# Patient Record
Sex: MALE | Race: WHITE | NOT HISPANIC OR LATINO | URBAN - METROPOLITAN AREA
[De-identification: names, ages, dates, MRNs, and addresses within clinical notes are randomized per-mention and may not be internally consistent; named-entity substitution may affect disease eponyms.]

---

## 2017-03-13 ENCOUNTER — OUTPATIENT (OUTPATIENT)
Dept: OUTPATIENT SERVICES | Facility: HOSPITAL | Age: 57
LOS: 1 days | End: 2017-03-13
Payer: COMMERCIAL

## 2017-03-13 VITALS
HEART RATE: 72 BPM | SYSTOLIC BLOOD PRESSURE: 120 MMHG | WEIGHT: 212.08 LBS | RESPIRATION RATE: 16 BRPM | DIASTOLIC BLOOD PRESSURE: 77 MMHG | TEMPERATURE: 98 F | HEIGHT: 69 IN | OXYGEN SATURATION: 97 %

## 2017-03-13 DIAGNOSIS — Z01.818 ENCOUNTER FOR OTHER PREPROCEDURAL EXAMINATION: ICD-10-CM

## 2017-03-13 DIAGNOSIS — Z98.890 OTHER SPECIFIED POSTPROCEDURAL STATES: Chronic | ICD-10-CM

## 2017-03-13 DIAGNOSIS — Z90.49 ACQUIRED ABSENCE OF OTHER SPECIFIED PARTS OF DIGESTIVE TRACT: Chronic | ICD-10-CM

## 2017-03-13 DIAGNOSIS — S83.232D COMPLEX TEAR OF MEDIAL MENISCUS, CURRENT INJURY, LEFT KNEE, SUBSEQUENT ENCOUNTER: ICD-10-CM

## 2017-03-13 DIAGNOSIS — S83.232A COMPLEX TEAR OF MEDIAL MENISCUS, CURRENT INJURY, LEFT KNEE, INITIAL ENCOUNTER: ICD-10-CM

## 2017-03-13 LAB
HCT VFR BLD CALC: 43.7 % — SIGNIFICANT CHANGE UP (ref 39–50)
HGB BLD-MCNC: 14.6 G/DL — SIGNIFICANT CHANGE UP (ref 13–17)
MCHC RBC-ENTMCNC: 30.7 PG — SIGNIFICANT CHANGE UP (ref 27–34)
MCHC RBC-ENTMCNC: 33.4 GM/DL — SIGNIFICANT CHANGE UP (ref 32–36)
MCV RBC AUTO: 91.8 FL — SIGNIFICANT CHANGE UP (ref 80–100)
PLATELET # BLD AUTO: 237 K/UL — SIGNIFICANT CHANGE UP (ref 150–400)
RBC # BLD: 4.76 M/UL — SIGNIFICANT CHANGE UP (ref 4.2–5.8)
RBC # FLD: 14 % — SIGNIFICANT CHANGE UP (ref 10.3–14.5)
WBC # BLD: 10.49 K/UL — SIGNIFICANT CHANGE UP (ref 3.8–10.5)
WBC # FLD AUTO: 10.49 K/UL — SIGNIFICANT CHANGE UP (ref 3.8–10.5)

## 2017-03-13 PROCEDURE — 93010 ELECTROCARDIOGRAM REPORT: CPT | Mod: NC

## 2017-03-13 PROCEDURE — 85027 COMPLETE CBC AUTOMATED: CPT

## 2017-03-13 PROCEDURE — 80053 COMPREHEN METABOLIC PANEL: CPT

## 2017-03-13 PROCEDURE — 93005 ELECTROCARDIOGRAM TRACING: CPT

## 2017-03-13 RX ORDER — ACETAMINOPHEN 500 MG
975 TABLET ORAL ONCE
Qty: 0 | Refills: 0 | Status: COMPLETED | OUTPATIENT
Start: 2017-03-16 | End: 2017-03-16

## 2017-03-13 RX ORDER — LIDOCAINE HCL 20 MG/ML
0.2 VIAL (ML) INJECTION ONCE
Qty: 0 | Refills: 0 | Status: DISCONTINUED | OUTPATIENT
Start: 2017-03-16 | End: 2017-03-31

## 2017-03-13 RX ORDER — CELECOXIB 200 MG/1
200 CAPSULE ORAL ONCE
Qty: 0 | Refills: 0 | Status: COMPLETED | OUTPATIENT
Start: 2017-03-16 | End: 2017-03-16

## 2017-03-13 RX ORDER — SODIUM CHLORIDE 9 MG/ML
3 INJECTION INTRAMUSCULAR; INTRAVENOUS; SUBCUTANEOUS EVERY 8 HOURS
Qty: 0 | Refills: 0 | Status: DISCONTINUED | OUTPATIENT
Start: 2017-03-16 | End: 2017-03-31

## 2017-03-13 NOTE — H&P PST ADULT - HISTORY OF PRESENT ILLNESS
57 year old male with c/o left knee pain x 3 months .Pt had orthopedic consult- s/ MRI left knee revealed 57 year old male with c/o left knee pain x 3 months .Pt had orthopedic consult- s/p MRI left knee revealed complex tear of medial meniscus left knee - for left knee arthroscopy on 03/16/2017.

## 2017-03-13 NOTE — H&P PST ADULT - PMH
Colitis  no flare up x 10 years  Dyslipidemia (high LDL; low HDL)    Seasonal allergies Colitis  no flare up x 10 years  Complex tear of medial meniscus of left knee    Dyslipidemia (high LDL; low HDL)    Seasonal allergies

## 2017-03-13 NOTE — H&P PST ADULT - NSANTHOSAYNRD_GEN_A_CORE
No. DIEGO screening performed.  STOP BANG Legend: 0-2 = LOW Risk; 3-4 = INTERMEDIATE Risk; 5-8 = HIGH Risk

## 2017-03-14 LAB
ALBUMIN SERPL ELPH-MCNC: 4.3 G/DL — SIGNIFICANT CHANGE UP (ref 3.3–5)
ALP SERPL-CCNC: 43 U/L — SIGNIFICANT CHANGE UP (ref 40–120)
ALT FLD-CCNC: 14 U/L — SIGNIFICANT CHANGE UP (ref 10–45)
ANION GAP SERPL CALC-SCNC: 16 MMOL/L — SIGNIFICANT CHANGE UP (ref 5–17)
AST SERPL-CCNC: 29 U/L — SIGNIFICANT CHANGE UP (ref 10–40)
BILIRUB SERPL-MCNC: 0.3 MG/DL — SIGNIFICANT CHANGE UP (ref 0.2–1.2)
BUN SERPL-MCNC: 14 MG/DL — SIGNIFICANT CHANGE UP (ref 7–23)
CALCIUM SERPL-MCNC: 9.7 MG/DL — SIGNIFICANT CHANGE UP (ref 8.4–10.5)
CHLORIDE SERPL-SCNC: 101 MMOL/L — SIGNIFICANT CHANGE UP (ref 96–108)
CO2 SERPL-SCNC: 22 MMOL/L — SIGNIFICANT CHANGE UP (ref 22–31)
CREAT SERPL-MCNC: 0.97 MG/DL — SIGNIFICANT CHANGE UP (ref 0.5–1.3)
GLUCOSE SERPL-MCNC: 82 MG/DL — SIGNIFICANT CHANGE UP (ref 70–99)
POTASSIUM SERPL-MCNC: 4.1 MMOL/L — SIGNIFICANT CHANGE UP (ref 3.5–5.3)
POTASSIUM SERPL-SCNC: 4.1 MMOL/L — SIGNIFICANT CHANGE UP (ref 3.5–5.3)
PROT SERPL-MCNC: 7.7 G/DL — SIGNIFICANT CHANGE UP (ref 6–8.3)
SODIUM SERPL-SCNC: 139 MMOL/L — SIGNIFICANT CHANGE UP (ref 135–145)

## 2017-03-16 ENCOUNTER — OUTPATIENT (OUTPATIENT)
Dept: OUTPATIENT SERVICES | Facility: HOSPITAL | Age: 57
LOS: 1 days | End: 2017-03-16
Payer: COMMERCIAL

## 2017-03-16 VITALS
SYSTOLIC BLOOD PRESSURE: 117 MMHG | OXYGEN SATURATION: 100 % | DIASTOLIC BLOOD PRESSURE: 80 MMHG | RESPIRATION RATE: 16 BRPM | HEART RATE: 59 BPM | TEMPERATURE: 98 F

## 2017-03-16 VITALS
SYSTOLIC BLOOD PRESSURE: 120 MMHG | OXYGEN SATURATION: 97 % | DIASTOLIC BLOOD PRESSURE: 77 MMHG | RESPIRATION RATE: 16 BRPM | HEIGHT: 69 IN | WEIGHT: 212.08 LBS | HEART RATE: 72 BPM | TEMPERATURE: 98 F

## 2017-03-16 DIAGNOSIS — S83.232D COMPLEX TEAR OF MEDIAL MENISCUS, CURRENT INJURY, LEFT KNEE, SUBSEQUENT ENCOUNTER: ICD-10-CM

## 2017-03-16 DIAGNOSIS — Z01.818 ENCOUNTER FOR OTHER PREPROCEDURAL EXAMINATION: ICD-10-CM

## 2017-03-16 DIAGNOSIS — Z98.890 OTHER SPECIFIED POSTPROCEDURAL STATES: Chronic | ICD-10-CM

## 2017-03-16 DIAGNOSIS — Z90.49 ACQUIRED ABSENCE OF OTHER SPECIFIED PARTS OF DIGESTIVE TRACT: Chronic | ICD-10-CM

## 2017-03-16 PROCEDURE — 29881 ARTHRS KNE SRG MNISECTMY M/L: CPT | Mod: LT

## 2017-03-16 RX ORDER — LIDOCAINE HCL 20 MG/ML
0 VIAL (ML) INJECTION
Qty: 0 | Refills: 0 | DISCHARGE
Start: 2017-03-16

## 2017-03-16 RX ORDER — ONDANSETRON 8 MG/1
4 TABLET, FILM COATED ORAL ONCE
Qty: 0 | Refills: 0 | Status: DISCONTINUED | OUTPATIENT
Start: 2017-03-16 | End: 2017-03-31

## 2017-03-16 RX ORDER — CELECOXIB 200 MG/1
200 CAPSULE ORAL ONCE
Qty: 0 | Refills: 0 | Status: DISCONTINUED | OUTPATIENT
Start: 2017-03-16 | End: 2017-03-31

## 2017-03-16 RX ADMIN — Medication 975 MILLIGRAM(S): at 16:29

## 2017-03-16 RX ADMIN — CELECOXIB 200 MILLIGRAM(S): 200 CAPSULE ORAL at 16:29

## 2017-03-16 NOTE — ASU DISCHARGE PLAN (ADULT/PEDIATRIC). - MEDICATION SUMMARY - MEDICATIONS TO STOP TAKING
I will STOP taking the medications listed below when I get home from the hospital:    Aleve 220 mg oral tablet  --  by mouth , As Needed

## 2017-03-16 NOTE — ASU PATIENT PROFILE, ADULT - PMH
Colitis  no flare up x 10 years  Complex tear of medial meniscus of left knee    Dyslipidemia (high LDL; low HDL)    Seasonal allergies

## 2017-03-16 NOTE — ASU DISCHARGE PLAN (ADULT/PEDIATRIC). - NOTIFY
Bleeding that does not stop/Unable to Urinate/Fever greater than 101/Inability to Tolerate Liquids or Foods/Swelling that continues/Persistent Nausea and Vomiting

## 2017-03-16 NOTE — ASU DISCHARGE PLAN (ADULT/PEDIATRIC). - MEDICATION SUMMARY - MEDICATIONS TO TAKE
I will START or STAY ON the medications listed below when I get home from the hospital:    Colazal 750 mg oral capsule  -- 3 cap(s) by mouth 3 times a day  -- Indication: For ulcerative colitis    Ecotrin 325 mg oral delayed release tablet  -- 1 tab(s) by mouth once a day x 6 weeks  -- Indication: For dvt ppx    lidocaine  --     -- Indication: For pain    Claritin 10 mg oral tablet  --  by mouth , As Needed  -- Indication: For allergies    Crestor 10 mg oral tablet  -- 1 tab(s) by mouth once a day (at bedtime)  -- Indication: For hyperlipidemia    Pepcid 20 mg oral tablet  -- 1  by mouth x 2 doses pre op as directed  -- Indication: For ulcer ppx

## 2022-02-21 ENCOUNTER — INPATIENT (INPATIENT)
Facility: HOSPITAL | Age: 62
LOS: 1 days | Discharge: HOME | DRG: 872 | End: 2022-02-23
Attending: INTERNAL MEDICINE | Admitting: INTERNAL MEDICINE
Payer: COMMERCIAL

## 2022-02-21 VITALS
SYSTOLIC BLOOD PRESSURE: 108 MMHG | HEIGHT: 69 IN | HEART RATE: 91 BPM | OXYGEN SATURATION: 98 % | WEIGHT: 197.09 LBS | DIASTOLIC BLOOD PRESSURE: 66 MMHG | RESPIRATION RATE: 20 BRPM | TEMPERATURE: 99 F

## 2022-02-21 DIAGNOSIS — Z29.9 ENCOUNTER FOR PROPHYLACTIC MEASURES, UNSPECIFIED: ICD-10-CM

## 2022-02-21 DIAGNOSIS — Z90.49 ACQUIRED ABSENCE OF OTHER SPECIFIED PARTS OF DIGESTIVE TRACT: Chronic | ICD-10-CM

## 2022-02-21 DIAGNOSIS — Z98.890 OTHER SPECIFIED POSTPROCEDURAL STATES: Chronic | ICD-10-CM

## 2022-02-21 DIAGNOSIS — K51.90 ULCERATIVE COLITIS, UNSPECIFIED, WITHOUT COMPLICATIONS: ICD-10-CM

## 2022-02-21 DIAGNOSIS — N12 TUBULO-INTERSTITIAL NEPHRITIS, NOT SPECIFIED AS ACUTE OR CHRONIC: ICD-10-CM

## 2022-02-21 DIAGNOSIS — E78.5 HYPERLIPIDEMIA, UNSPECIFIED: ICD-10-CM

## 2022-02-21 DIAGNOSIS — N10 ACUTE PYELONEPHRITIS: ICD-10-CM

## 2022-02-21 PROBLEM — E78.4 OTHER HYPERLIPIDEMIA: Chronic | Status: ACTIVE | Noted: 2017-03-13

## 2022-02-21 PROBLEM — K52.9 NONINFECTIVE GASTROENTERITIS AND COLITIS, UNSPECIFIED: Chronic | Status: ACTIVE | Noted: 2017-03-13

## 2022-02-21 PROBLEM — S83.232A COMPLEX TEAR OF MEDIAL MENISCUS, CURRENT INJURY, LEFT KNEE, INITIAL ENCOUNTER: Chronic | Status: ACTIVE | Noted: 2017-03-13

## 2022-02-21 PROBLEM — J30.2 OTHER SEASONAL ALLERGIC RHINITIS: Chronic | Status: ACTIVE | Noted: 2017-03-13

## 2022-02-21 LAB
ALBUMIN SERPL ELPH-MCNC: 3.8 G/DL — SIGNIFICANT CHANGE UP (ref 3.3–5)
ALP SERPL-CCNC: 40 U/L — SIGNIFICANT CHANGE UP (ref 40–120)
ALT FLD-CCNC: 57 U/L — HIGH (ref 10–45)
ANION GAP SERPL CALC-SCNC: 14 MMOL/L — SIGNIFICANT CHANGE UP (ref 5–17)
APPEARANCE UR: CLEAR — SIGNIFICANT CHANGE UP
AST SERPL-CCNC: 83 U/L — HIGH (ref 10–40)
B-OH-BUTYR SERPL-SCNC: 0.2 MMOL/L — SIGNIFICANT CHANGE UP
BACTERIA # UR AUTO: NEGATIVE — SIGNIFICANT CHANGE UP
BASE EXCESS BLDV CALC-SCNC: 2 MMOL/L — SIGNIFICANT CHANGE UP (ref -2–2)
BASOPHILS # BLD AUTO: 0.03 K/UL — SIGNIFICANT CHANGE UP (ref 0–0.2)
BASOPHILS NFR BLD AUTO: 0.2 % — SIGNIFICANT CHANGE UP (ref 0–2)
BILIRUB SERPL-MCNC: 0.4 MG/DL — SIGNIFICANT CHANGE UP (ref 0.2–1.2)
BILIRUB UR-MCNC: NEGATIVE — SIGNIFICANT CHANGE UP
BUN SERPL-MCNC: 20 MG/DL — SIGNIFICANT CHANGE UP (ref 7–23)
CA-I SERPL-SCNC: 1.19 MMOL/L — SIGNIFICANT CHANGE UP (ref 1.15–1.33)
CALCIUM SERPL-MCNC: 9.4 MG/DL — SIGNIFICANT CHANGE UP (ref 8.4–10.5)
CHLORIDE BLDV-SCNC: 97 MMOL/L — SIGNIFICANT CHANGE UP (ref 96–108)
CHLORIDE SERPL-SCNC: 96 MMOL/L — SIGNIFICANT CHANGE UP (ref 96–108)
CO2 BLDV-SCNC: 28 MMOL/L — HIGH (ref 22–26)
CO2 SERPL-SCNC: 23 MMOL/L — SIGNIFICANT CHANGE UP (ref 22–31)
COLOR SPEC: SIGNIFICANT CHANGE UP
CREAT SERPL-MCNC: 1.11 MG/DL — SIGNIFICANT CHANGE UP (ref 0.5–1.3)
DIFF PNL FLD: NEGATIVE — SIGNIFICANT CHANGE UP
EOSINOPHIL # BLD AUTO: 0.04 K/UL — SIGNIFICANT CHANGE UP (ref 0–0.5)
EOSINOPHIL NFR BLD AUTO: 0.3 % — SIGNIFICANT CHANGE UP (ref 0–6)
EPI CELLS # UR: 1 /HPF — SIGNIFICANT CHANGE UP
GAS PNL BLDV: 130 MMOL/L — LOW (ref 136–145)
GAS PNL BLDV: SIGNIFICANT CHANGE UP
GAS PNL BLDV: SIGNIFICANT CHANGE UP
GLUCOSE BLDV-MCNC: 100 MG/DL — HIGH (ref 70–99)
GLUCOSE SERPL-MCNC: 95 MG/DL — SIGNIFICANT CHANGE UP (ref 70–99)
GLUCOSE UR QL: NEGATIVE — SIGNIFICANT CHANGE UP
HCO3 BLDV-SCNC: 26 MMOL/L — SIGNIFICANT CHANGE UP (ref 22–29)
HCT VFR BLD CALC: 44.1 % — SIGNIFICANT CHANGE UP (ref 39–50)
HCT VFR BLDA CALC: 46 % — SIGNIFICANT CHANGE UP (ref 39–51)
HGB BLD CALC-MCNC: 15.2 G/DL — SIGNIFICANT CHANGE UP (ref 12.6–17.4)
HGB BLD-MCNC: 14.8 G/DL — SIGNIFICANT CHANGE UP (ref 13–17)
HYALINE CASTS # UR AUTO: 1 /LPF — SIGNIFICANT CHANGE UP (ref 0–2)
IMM GRANULOCYTES NFR BLD AUTO: 0.3 % — SIGNIFICANT CHANGE UP (ref 0–1.5)
KETONES UR-MCNC: NEGATIVE — SIGNIFICANT CHANGE UP
LACTATE BLDV-MCNC: 1.3 MMOL/L — SIGNIFICANT CHANGE UP (ref 0.7–2)
LEUKOCYTE ESTERASE UR-ACNC: NEGATIVE — SIGNIFICANT CHANGE UP
LYMPHOCYTES # BLD AUTO: 1.41 K/UL — SIGNIFICANT CHANGE UP (ref 1–3.3)
LYMPHOCYTES # BLD AUTO: 10.6 % — LOW (ref 13–44)
MAGNESIUM SERPL-MCNC: 2.2 MG/DL — SIGNIFICANT CHANGE UP (ref 1.6–2.6)
MCHC RBC-ENTMCNC: 30.4 PG — SIGNIFICANT CHANGE UP (ref 27–34)
MCHC RBC-ENTMCNC: 33.6 GM/DL — SIGNIFICANT CHANGE UP (ref 32–36)
MCV RBC AUTO: 90.6 FL — SIGNIFICANT CHANGE UP (ref 80–100)
MONOCYTES # BLD AUTO: 1.23 K/UL — HIGH (ref 0–0.9)
MONOCYTES NFR BLD AUTO: 9.2 % — SIGNIFICANT CHANGE UP (ref 2–14)
NEUTROPHILS # BLD AUTO: 10.59 K/UL — HIGH (ref 1.8–7.4)
NEUTROPHILS NFR BLD AUTO: 79.4 % — HIGH (ref 43–77)
NITRITE UR-MCNC: NEGATIVE — SIGNIFICANT CHANGE UP
NRBC # BLD: 0 /100 WBCS — SIGNIFICANT CHANGE UP (ref 0–0)
PCO2 BLDV: 40 MMHG — LOW (ref 42–55)
PH BLDV: 7.43 — SIGNIFICANT CHANGE UP (ref 7.32–7.43)
PH UR: 6 — SIGNIFICANT CHANGE UP (ref 5–8)
PLATELET # BLD AUTO: 254 K/UL — SIGNIFICANT CHANGE UP (ref 150–400)
PO2 BLDV: 31 MMHG — SIGNIFICANT CHANGE UP (ref 25–45)
POTASSIUM BLDV-SCNC: 3.4 MMOL/L — LOW (ref 3.5–5.1)
POTASSIUM SERPL-MCNC: 3.5 MMOL/L — SIGNIFICANT CHANGE UP (ref 3.5–5.3)
POTASSIUM SERPL-SCNC: 3.5 MMOL/L — SIGNIFICANT CHANGE UP (ref 3.5–5.3)
PROT SERPL-MCNC: 7.8 G/DL — SIGNIFICANT CHANGE UP (ref 6–8.3)
PROT UR-MCNC: ABNORMAL
RAPID RVP RESULT: SIGNIFICANT CHANGE UP
RBC # BLD: 4.87 M/UL — SIGNIFICANT CHANGE UP (ref 4.2–5.8)
RBC # FLD: 13.9 % — SIGNIFICANT CHANGE UP (ref 10.3–14.5)
RBC CASTS # UR COMP ASSIST: 4 /HPF — SIGNIFICANT CHANGE UP (ref 0–4)
SAO2 % BLDV: 54.6 % — LOW (ref 67–88)
SARS-COV-2 RNA SPEC QL NAA+PROBE: SIGNIFICANT CHANGE UP
SODIUM SERPL-SCNC: 133 MMOL/L — LOW (ref 135–145)
SP GR SPEC: 1.02 — SIGNIFICANT CHANGE UP (ref 1.01–1.02)
UROBILINOGEN FLD QL: NEGATIVE — SIGNIFICANT CHANGE UP
WBC # BLD: 13.34 K/UL — HIGH (ref 3.8–10.5)
WBC # FLD AUTO: 13.34 K/UL — HIGH (ref 3.8–10.5)
WBC UR QL: 3 /HPF — SIGNIFICANT CHANGE UP (ref 0–5)

## 2022-02-21 PROCEDURE — 99223 1ST HOSP IP/OBS HIGH 75: CPT

## 2022-02-21 PROCEDURE — 74177 CT ABD & PELVIS W/CONTRAST: CPT | Mod: 26,MA

## 2022-02-21 PROCEDURE — 71046 X-RAY EXAM CHEST 2 VIEWS: CPT | Mod: 26

## 2022-02-21 PROCEDURE — 99285 EMERGENCY DEPT VISIT HI MDM: CPT

## 2022-02-21 RX ORDER — ASCORBIC ACID 60 MG
500 TABLET,CHEWABLE ORAL DAILY
Refills: 0 | Status: DISCONTINUED | OUTPATIENT
Start: 2022-02-21 | End: 2022-02-23

## 2022-02-21 RX ORDER — ATORVASTATIN CALCIUM 80 MG/1
40 TABLET, FILM COATED ORAL AT BEDTIME
Refills: 0 | Status: DISCONTINUED | OUTPATIENT
Start: 2022-02-21 | End: 2022-02-23

## 2022-02-21 RX ORDER — ASPIRIN/CALCIUM CARB/MAGNESIUM 324 MG
1 TABLET ORAL
Qty: 0 | Refills: 0 | DISCHARGE

## 2022-02-21 RX ORDER — SODIUM CHLORIDE 9 MG/ML
1000 INJECTION INTRAMUSCULAR; INTRAVENOUS; SUBCUTANEOUS ONCE
Refills: 0 | Status: COMPLETED | OUTPATIENT
Start: 2022-02-21 | End: 2022-02-21

## 2022-02-21 RX ORDER — FAMOTIDINE 10 MG/ML
1 INJECTION INTRAVENOUS
Qty: 0 | Refills: 0 | DISCHARGE

## 2022-02-21 RX ORDER — LORATADINE 10 MG/1
0 TABLET ORAL
Qty: 0 | Refills: 0 | DISCHARGE

## 2022-02-21 RX ORDER — ENOXAPARIN SODIUM 100 MG/ML
40 INJECTION SUBCUTANEOUS DAILY
Refills: 0 | Status: DISCONTINUED | OUTPATIENT
Start: 2022-02-21 | End: 2022-02-22

## 2022-02-21 RX ORDER — ACETAMINOPHEN 500 MG
975 TABLET ORAL ONCE
Refills: 0 | Status: COMPLETED | OUTPATIENT
Start: 2022-02-21 | End: 2022-02-21

## 2022-02-21 RX ORDER — LORATADINE 10 MG/1
10 TABLET ORAL DAILY
Refills: 0 | Status: DISCONTINUED | OUTPATIENT
Start: 2022-02-21 | End: 2022-02-22

## 2022-02-21 RX ORDER — CEFTRIAXONE 500 MG/1
1000 INJECTION, POWDER, FOR SOLUTION INTRAMUSCULAR; INTRAVENOUS EVERY 24 HOURS
Refills: 0 | Status: DISCONTINUED | OUTPATIENT
Start: 2022-02-21 | End: 2022-02-23

## 2022-02-21 RX ORDER — BALSALAZIDE DISODIUM 750 MG/1
2250 CAPSULE ORAL THREE TIMES A DAY
Refills: 0 | Status: DISCONTINUED | OUTPATIENT
Start: 2022-02-21 | End: 2022-02-23

## 2022-02-21 RX ORDER — FINASTERIDE 5 MG/1
5 TABLET, FILM COATED ORAL DAILY
Refills: 0 | Status: DISCONTINUED | OUTPATIENT
Start: 2022-02-21 | End: 2022-02-23

## 2022-02-21 RX ORDER — CEFTRIAXONE 500 MG/1
1000 INJECTION, POWDER, FOR SOLUTION INTRAMUSCULAR; INTRAVENOUS ONCE
Refills: 0 | Status: COMPLETED | OUTPATIENT
Start: 2022-02-21 | End: 2022-02-21

## 2022-02-21 RX ORDER — ACETAMINOPHEN 500 MG
650 TABLET ORAL EVERY 6 HOURS
Refills: 0 | Status: DISCONTINUED | OUTPATIENT
Start: 2022-02-21 | End: 2022-02-23

## 2022-02-21 RX ADMIN — Medication 975 MILLIGRAM(S): at 15:45

## 2022-02-21 RX ADMIN — Medication 975 MILLIGRAM(S): at 16:34

## 2022-02-21 RX ADMIN — SODIUM CHLORIDE 1000 MILLILITER(S): 9 INJECTION INTRAMUSCULAR; INTRAVENOUS; SUBCUTANEOUS at 15:48

## 2022-02-21 RX ADMIN — CEFTRIAXONE 100 MILLIGRAM(S): 500 INJECTION, POWDER, FOR SOLUTION INTRAMUSCULAR; INTRAVENOUS at 20:12

## 2022-02-21 RX ADMIN — SODIUM CHLORIDE 1000 MILLILITER(S): 9 INJECTION INTRAMUSCULAR; INTRAVENOUS; SUBCUTANEOUS at 16:34

## 2022-02-21 NOTE — H&P ADULT - RS GEN PE MLT RESP DETAILS PC
breath sounds equal/respirations non-labored/clear to auscultation bilaterally/no chest wall tenderness

## 2022-02-21 NOTE — ED PROVIDER NOTE - NS ED ROS FT
Constitutional :fevers;  chills  HEENT: no visual changes, no sore throat, no rhinorrhea  CV: no cp; no palpitations  Resp: no sob; no cough  GI: no abd pain, nausea, no vomiting, diarrhea, no constipation  : no dysuria, no hematuria  MSK: no myalgias; no arthralgias  skin: no rashes  neuro: no HA, no numbness; no weakness, no tingling  endo:  polyuria, polydipsia

## 2022-02-21 NOTE — ED PROVIDER NOTE - CLINICAL SUMMARY MEDICAL DECISION MAKING FREE TEXT BOX
Melecio Mcrae MD. pt presents for 6 days of fever [id number], chills, night sweats, diarrhea, nausea. also 3 weeks of polyuria, polydipsia. HD stable. pt abd soft nt nd. fever of unclear etiology at this time but also concern for new onset diabetes and possible DKA. will check labs, iv fluids, cxr, urine, reassess

## 2022-02-21 NOTE — H&P ADULT - NSHPSOCIALHISTORY_GEN_ALL_CORE
reports previously being an alcohol but quit drinking ~25yrs ago, quit smoking ~20yrs ago, denies recreational drug use, works as a  and notes occasional exposure to asbestos

## 2022-02-21 NOTE — H&P ADULT - NSHPPHYSICALEXAM_GEN_ALL_CORE
Vital Signs Last 24 Hrs  T(C): 36.8 (21 Feb 2022 20:17), Max: 38.9 (21 Feb 2022 15:35)  T(F): 98.2 (21 Feb 2022 20:17), Max: 102.1 (21 Feb 2022 15:35)  HR: 67 (21 Feb 2022 20:17) (67 - 91)  BP: 123/81 (21 Feb 2022 20:17) (108/66 - 123/81)  BP(mean): --  RR: 18 (21 Feb 2022 20:17) (17 - 20)  SpO2: 98% (21 Feb 2022 20:17) (98% - 100%)

## 2022-02-21 NOTE — ED ADULT NURSE NOTE - OBJECTIVE STATEMENT
62 y m came to the ed c/o intermittent fevers, night sweats, nausea/vomiting/diarrhea and decreased PO intake for 1 week. states he called his PMD who was unable to see him. hx of colitis although denies bloody stool or vomiting. patient is a/ox3. c/o increased urinary frequency but denies pain/burning on urination. skin is warm and dry.

## 2022-02-21 NOTE — ED PROVIDER NOTE - NSICDXPASTMEDICALHX_GEN_ALL_CORE_FT
PAST MEDICAL HISTORY:  Colitis no flare up x 10 years    Complex tear of medial meniscus of left knee     Dyslipidemia (high LDL; low HDL)     Seasonal allergies

## 2022-02-21 NOTE — ED ADULT NURSE REASSESSMENT NOTE - NS ED NURSE REASSESS COMMENT FT1
per MD Huggins patient ok to eat and drink. patient provided with sandwiches, ginger ale at this time

## 2022-02-21 NOTE — ED PROVIDER NOTE - PHYSICAL EXAMINATION
PHYSICAL EXAM:  GENERAL: non-toxic appearing; in no respiratory distress  HEAD Atraumatic, Normocephalic  ENT: dry mucous membranes;  NECK: No JVD; trachea midline  EYES: PERRL, EOMs intact b/l w/out deficits; normal conjunctiva  CHEST/LUNG: CTAB no wheezes/rhonchi/rales  HEART: RRR no murmur/gallops/rubs  ABDOMEN: +BS, soft, NT, ND  EXTREMITIES: No LE edema, +2 radial pulses b/l, +2 DP/PT pulses b/l  MUSCULOSKELETAL: FROM of all 4 extremities;  NERVOUS SYSTEM:  A&Ox3, No motor deficits or sensory deficits; CNII-XII intact; Speech is fluent and appropriate  SKIN:  Warm and dry as visualized

## 2022-02-21 NOTE — ED PROVIDER NOTE - ATTENDING CONTRIBUTION TO CARE
62 M w/ hx of HLD, colitis, prior appy presents ot the Er w/ fever tmax 102.7 took advil today pt states no rashes, reports 1-3 times of diarrhea a day no blood in stool, pt also states had two episodes of vomiting occurred 5-6 days ago now resolved. pt w/ no recent antibiotic usage. Pt denies any lightheadedness, no dizziness, no dysuria, + urinary freq/urgency, on exam, pt is awake and alert in mild distress, has clear lungs soft abdomen w/ no cva tenderness, no lower leg swelling, no rashes. moving all extremities. Plan for labs and reassessment. concern for possible sepsis, infectious diarrhea, dispo pending results likely TBA 62 M w/ hx of HLD, colitis, prior appy presents ot the Er w/ fever tmax 102.7 took advil today pt states no rashes, reports 1-3 times of diarrhea a day no blood in stool, pt also states had two episodes of vomiting occurred 5-6 days ago now resolved. pt w/ no recent antibiotic usage. Pt denies any lightheadedness, no dizziness, no dysuria, + urinary freq/urgency, on exam, pt is awake and alert in mild distress, has clear lungs soft abdomenmild RLQ tenderness w/ no cva tenderness, no lower leg swelling, no rashes. moving all extremities. Plan for labs and reassessment. concern for possible sepsis, infectious diarrhea, dispo pending results likely TBA ct ordered given pt w/ known hx of UC to assess for fever of unknown origin possible intraabdominal infection .

## 2022-02-21 NOTE — ED PROVIDER NOTE - PROGRESS NOTE DETAILS
Nik Saeed, PGY2. CT showing pyelonephritis. Abx given. Signed out to Dr. Lundy. Stable at this time, will admit.

## 2022-02-21 NOTE — ED PROVIDER NOTE - NSICDXPASTSURGICALHX_GEN_ALL_CORE_FT
PAST SURGICAL HISTORY:  H/O arthroscopy of right knee     History of appendectomy     History of strabismus surgery

## 2022-02-21 NOTE — ED ADULT NURSE NOTE - NSIMPLEMENTINTERV_GEN_ALL_ED
Implemented All Universal Safety Interventions:  [place] to call system. Call bell, personal items and telephone within reach. Instruct patient to call for assistance. Room bathroom lighting operational. Non-slip footwear when patient is off stretcher. Physically safe environment: no spills, clutter or unnecessary equipment. Stretcher in lowest position, wheels locked, appropriate side rails in place.

## 2022-02-21 NOTE — H&P ADULT - HISTORY OF PRESENT ILLNESS
61yo M w/ PMHx of Ulcerative Colitis, HLD presents with fever, he reports that starting a week ago he began having persistent sweats that drenched the bed sheets, he subsequently became febrile as well up to 102, he tried taking tylenol for his symptoms which provided only temporary relief, he states his symptoms initially were only at night but then symptoms persisted through the day as well, he endorses associated diarrhea and 2 isolated episodes of non-bloody non-bilious vomit but denies cough, SOB, chest pain, hematochezia, melena, flank pain, hematuria or dysuria, in the ED he was febrile to 102.1 but otherwise hemodynamically stable, saturating well on room air, labs were significant for leukocytosis and mild transaminitis, CXR was unremarkable, CT A/P showed findings concerning for ureteritis/nephritis, patient was given ceftriaxone 1g x1 and admitted t to general medicine for further management.

## 2022-02-21 NOTE — ED PROVIDER NOTE - OBJECTIVE STATEMENT
63 yo M pmhx HLD, colitis, s/p appy, presents to ED c/o 6 days of intermittent fevers tmax 102. improves after taking otc tylenol but fever returns. also c/o night sweats and chills, inability to tolerate PO due to nausa, and watery diarrhea. He is also c/o weeks of polyuria and polydipsia. He denies abd pain or vomiting. he denies cough, cp, sob, dizziness, loc, recent travel, recent abx use, melena, brbpr.

## 2022-02-21 NOTE — H&P ADULT - PROBLEM SELECTOR PLAN 1
-U/A unremarkable but imaging findings, fever and leukocytosis highly suspicious for pyelonephritis   -s/p ceftriaxone x1 in ED  -c/w ceftriaxone 1g daily   -f/u urine cultures  -obtain blader scan  -monitor I/O's -U/A unremarkable but imaging findings, fever and leukocytosis highly suspicious for acute pyelonephritis   -s/p ceftriaxone x1 in ED  -c/w ceftriaxone 1g daily   -f/u urine cultures  -obtain blader scan  -monitor I/O's  -Tylenol PRN for fever or pain

## 2022-02-22 LAB
ALBUMIN SERPL ELPH-MCNC: 3.3 G/DL — SIGNIFICANT CHANGE UP (ref 3.3–5)
ALP SERPL-CCNC: 34 U/L — LOW (ref 40–120)
ALT FLD-CCNC: 46 U/L — HIGH (ref 10–45)
ANION GAP SERPL CALC-SCNC: 12 MMOL/L — SIGNIFICANT CHANGE UP (ref 5–17)
AST SERPL-CCNC: 55 U/L — HIGH (ref 10–40)
BASOPHILS # BLD AUTO: 0.02 K/UL — SIGNIFICANT CHANGE UP (ref 0–0.2)
BASOPHILS NFR BLD AUTO: 0.2 % — SIGNIFICANT CHANGE UP (ref 0–2)
BILIRUB SERPL-MCNC: 0.4 MG/DL — SIGNIFICANT CHANGE UP (ref 0.2–1.2)
BUN SERPL-MCNC: 17 MG/DL — SIGNIFICANT CHANGE UP (ref 7–23)
CALCIUM SERPL-MCNC: 9 MG/DL — SIGNIFICANT CHANGE UP (ref 8.4–10.5)
CHLORIDE SERPL-SCNC: 100 MMOL/L — SIGNIFICANT CHANGE UP (ref 96–108)
CO2 SERPL-SCNC: 23 MMOL/L — SIGNIFICANT CHANGE UP (ref 22–31)
CREAT SERPL-MCNC: 1.02 MG/DL — SIGNIFICANT CHANGE UP (ref 0.5–1.3)
CULTURE RESULTS: SIGNIFICANT CHANGE UP
EOSINOPHIL # BLD AUTO: 0.07 K/UL — SIGNIFICANT CHANGE UP (ref 0–0.5)
EOSINOPHIL NFR BLD AUTO: 0.7 % — SIGNIFICANT CHANGE UP (ref 0–6)
GLUCOSE SERPL-MCNC: 92 MG/DL — SIGNIFICANT CHANGE UP (ref 70–99)
HCT VFR BLD CALC: 37.5 % — LOW (ref 39–50)
HCV AB S/CO SERPL IA: 0.11 S/CO — SIGNIFICANT CHANGE UP (ref 0–0.99)
HCV AB SERPL-IMP: SIGNIFICANT CHANGE UP
HGB BLD-MCNC: 12.6 G/DL — LOW (ref 13–17)
IMM GRANULOCYTES NFR BLD AUTO: 0.6 % — SIGNIFICANT CHANGE UP (ref 0–1.5)
LYMPHOCYTES # BLD AUTO: 1.66 K/UL — SIGNIFICANT CHANGE UP (ref 1–3.3)
LYMPHOCYTES # BLD AUTO: 16.8 % — SIGNIFICANT CHANGE UP (ref 13–44)
MAGNESIUM SERPL-MCNC: 2.3 MG/DL — SIGNIFICANT CHANGE UP (ref 1.6–2.6)
MCHC RBC-ENTMCNC: 30.4 PG — SIGNIFICANT CHANGE UP (ref 27–34)
MCHC RBC-ENTMCNC: 33.6 GM/DL — SIGNIFICANT CHANGE UP (ref 32–36)
MCV RBC AUTO: 90.6 FL — SIGNIFICANT CHANGE UP (ref 80–100)
MONOCYTES # BLD AUTO: 1.08 K/UL — HIGH (ref 0–0.9)
MONOCYTES NFR BLD AUTO: 10.9 % — SIGNIFICANT CHANGE UP (ref 2–14)
NEUTROPHILS # BLD AUTO: 6.99 K/UL — SIGNIFICANT CHANGE UP (ref 1.8–7.4)
NEUTROPHILS NFR BLD AUTO: 70.8 % — SIGNIFICANT CHANGE UP (ref 43–77)
NRBC # BLD: 0 /100 WBCS — SIGNIFICANT CHANGE UP (ref 0–0)
PHOSPHATE SERPL-MCNC: 4 MG/DL — SIGNIFICANT CHANGE UP (ref 2.5–4.5)
PLATELET # BLD AUTO: 222 K/UL — SIGNIFICANT CHANGE UP (ref 150–400)
POTASSIUM SERPL-MCNC: 3.9 MMOL/L — SIGNIFICANT CHANGE UP (ref 3.5–5.3)
POTASSIUM SERPL-SCNC: 3.9 MMOL/L — SIGNIFICANT CHANGE UP (ref 3.5–5.3)
PROT SERPL-MCNC: 6.6 G/DL — SIGNIFICANT CHANGE UP (ref 6–8.3)
RBC # BLD: 4.14 M/UL — LOW (ref 4.2–5.8)
RBC # FLD: 14.1 % — SIGNIFICANT CHANGE UP (ref 10.3–14.5)
SODIUM SERPL-SCNC: 135 MMOL/L — SIGNIFICANT CHANGE UP (ref 135–145)
SPECIMEN SOURCE: SIGNIFICANT CHANGE UP
WBC # BLD: 9.88 K/UL — SIGNIFICANT CHANGE UP (ref 3.8–10.5)
WBC # FLD AUTO: 9.88 K/UL — SIGNIFICANT CHANGE UP (ref 3.8–10.5)

## 2022-02-22 RX ORDER — INFLUENZA VIRUS VACCINE 15; 15; 15; 15 UG/.5ML; UG/.5ML; UG/.5ML; UG/.5ML
0.5 SUSPENSION INTRAMUSCULAR ONCE
Refills: 0 | Status: DISCONTINUED | OUTPATIENT
Start: 2022-02-22 | End: 2022-02-23

## 2022-02-22 RX ORDER — SODIUM CHLORIDE 9 MG/ML
1000 INJECTION, SOLUTION INTRAVENOUS
Refills: 0 | Status: DISCONTINUED | OUTPATIENT
Start: 2022-02-22 | End: 2022-02-23

## 2022-02-22 RX ORDER — TAMSULOSIN HYDROCHLORIDE 0.4 MG/1
0.4 CAPSULE ORAL AT BEDTIME
Refills: 0 | Status: DISCONTINUED | OUTPATIENT
Start: 2022-02-22 | End: 2022-02-23

## 2022-02-22 RX ADMIN — SODIUM CHLORIDE 75 MILLILITER(S): 9 INJECTION, SOLUTION INTRAVENOUS at 14:13

## 2022-02-22 RX ADMIN — TAMSULOSIN HYDROCHLORIDE 0.4 MILLIGRAM(S): 0.4 CAPSULE ORAL at 21:54

## 2022-02-22 RX ADMIN — ATORVASTATIN CALCIUM 40 MILLIGRAM(S): 80 TABLET, FILM COATED ORAL at 00:37

## 2022-02-22 RX ADMIN — BALSALAZIDE DISODIUM 2250 MILLIGRAM(S): 750 CAPSULE ORAL at 08:31

## 2022-02-22 RX ADMIN — Medication 1 TABLET(S): at 11:09

## 2022-02-22 RX ADMIN — BALSALAZIDE DISODIUM 2250 MILLIGRAM(S): 750 CAPSULE ORAL at 21:55

## 2022-02-22 RX ADMIN — Medication 650 MILLIGRAM(S): at 00:36

## 2022-02-22 RX ADMIN — Medication 500 MILLIGRAM(S): at 11:09

## 2022-02-22 RX ADMIN — FINASTERIDE 5 MILLIGRAM(S): 5 TABLET, FILM COATED ORAL at 11:09

## 2022-02-22 RX ADMIN — BALSALAZIDE DISODIUM 2250 MILLIGRAM(S): 750 CAPSULE ORAL at 15:00

## 2022-02-22 RX ADMIN — SODIUM CHLORIDE 100 MILLILITER(S): 9 INJECTION, SOLUTION INTRAVENOUS at 11:09

## 2022-02-22 RX ADMIN — Medication 650 MILLIGRAM(S): at 03:00

## 2022-02-22 RX ADMIN — ATORVASTATIN CALCIUM 40 MILLIGRAM(S): 80 TABLET, FILM COATED ORAL at 21:54

## 2022-02-22 RX ADMIN — BALSALAZIDE DISODIUM 2250 MILLIGRAM(S): 750 CAPSULE ORAL at 01:20

## 2022-02-22 RX ADMIN — CEFTRIAXONE 100 MILLIGRAM(S): 500 INJECTION, POWDER, FOR SOLUTION INTRAMUSCULAR; INTRAVENOUS at 21:58

## 2022-02-22 NOTE — PROGRESS NOTE ADULT - SUBJECTIVE AND OBJECTIVE BOX
INTERVAL HPI/OVERNIGHT EVENTS:  Pt seen and examined at bedside.     Allergies/Intolerance: No Known Allergies      MEDICATIONS  (STANDING):  ascorbic acid 500 milliGRAM(s) Oral daily  atorvastatin 40 milliGRAM(s) Oral at bedtime  balsalazide 2250 milliGRAM(s) Oral three times a day  cefTRIAXone   IVPB 1000 milliGRAM(s) IV Intermittent every 24 hours  enoxaparin Injectable 40 milliGRAM(s) SubCutaneous daily  finasteride 5 milliGRAM(s) Oral daily  loratadine 10 milliGRAM(s) Oral daily  multivitamin 1 Tablet(s) Oral daily    MEDICATIONS  (PRN):  acetaminophen     Tablet .. 650 milliGRAM(s) Oral every 6 hours PRN Temp greater or equal to 38C (100.4F), Mild Pain (1 - 3), Moderate Pain (4 - 6)        ROS: all systems reviewed and wnl      PHYSICAL EXAMINATION:  Vital Signs Last 24 Hrs  T(C): 36.7 (2022 05:21), Max: 38.9 (2022 15:35)  T(F): 98.1 (2022 05:21), Max: 102.1 (2022 15:35)  HR: 63 (2022 05:21) (63 - 93)  BP: 109/68 (2022 05:21) (108/66 - 127/81)  BP(mean): --  RR: 18 (2022 05:21) (17 - 20)  SpO2: 94% (2022 05:21) (94% - 100%)  CAPILLARY BLOOD GLUCOSE            GENERAL: in bed, stable, tolerates PO diet, no SOB or fevers.   NECK: supple, No JVD  CHEST/LUNG: clear to auscultation bilaterally; no rales, rhonchi, or wheezing b/l  HEART: normal S1, S2  ABDOMEN: BS+, soft, ND, NT   EXTREMITIES:  pulses palpable; no clubbing, cyanosis, or edema b/l LEs  SKIN: no rashes or lesions      LABS:                        12.6   9.88  )-----------( 222      ( 2022 06:55 )             37.5     02-22    135  |  100  |  17  ----------------------------<  92  3.9   |  23  |  1.02    Ca    9.0      2022 06:55  Phos  4.0       Mg     2.3         TPro  6.6  /  Alb  3.3  /  TBili  0.4  /  DBili  x   /  AST  55<H>  /  ALT  46<H>  /  AlkPhos  34<L>        Urinalysis Basic - ( 2022 19:38 )    Color: Light Yellow / Appearance: Clear / S.025 / pH: x  Gluc: x / Ketone: Negative  / Bili: Negative / Urobili: Negative   Blood: x / Protein: Trace / Nitrite: Negative   Leuk Esterase: Negative / RBC: 4 /hpf / WBC 3 /HPF   Sq Epi: x / Non Sq Epi: 1 /hpf / Bacteria: Negative             INTERVAL HPI/OVERNIGHT EVENTS:  Pt seen and examined at bedside.     Allergies/Intolerance: No Known Allergies      MEDICATIONS  (STANDING):  ascorbic acid 500 milliGRAM(s) Oral daily  atorvastatin 40 milliGRAM(s) Oral at bedtime  balsalazide 2250 milliGRAM(s) Oral three times a day  cefTRIAXone   IVPB 1000 milliGRAM(s) IV Intermittent every 24 hours  enoxaparin Injectable 40 milliGRAM(s) SubCutaneous daily  finasteride 5 milliGRAM(s) Oral daily  loratadine 10 milliGRAM(s) Oral daily  multivitamin 1 Tablet(s) Oral daily    MEDICATIONS  (PRN):  acetaminophen     Tablet .. 650 milliGRAM(s) Oral every 6 hours PRN Temp greater or equal to 38C (100.4F), Mild Pain (1 - 3), Moderate Pain (4 - 6)        ROS: all systems reviewed and wnl      PHYSICAL EXAMINATION:  Vital Signs Last 24 Hrs  T(C): 36.7 (2022 05:21), Max: 38.9 (2022 15:35)  T(F): 98.1 (2022 05:21), Max: 102.1 (2022 15:35)  HR: 63 (2022 05:21) (63 - 93)  BP: 109/68 (2022 05:21) (108/66 - 127/81)  BP(mean): --  RR: 18 (2022 05:21) (17 - 20)  SpO2: 94% (2022 05:21) (94% - 100%)  CAPILLARY BLOOD GLUCOSE            GENERAL: in bed, stable, tolerates PO diet, no SOB or fevers, ambulates well.    NECK: supple, No JVD  CHEST/LUNG: clear to auscultation bilaterally; no rales, rhonchi, or wheezing b/l  HEART: normal S1, S2  ABDOMEN: BS+, soft, ND, NT   EXTREMITIES:  pulses palpable; no clubbing, cyanosis, or edema b/l LEs  SKIN: no rashes or lesions      LABS:                        12.6   9.88  )-----------( 222      ( 2022 06:55 )             37.5     02-    135  |  100  |  17  ----------------------------<  92  3.9   |  23  |  1.02    Ca    9.0      2022 06:55  Phos  4.0       Mg     2.3         TPro  6.6  /  Alb  3.3  /  TBili  0.4  /  DBili  x   /  AST  55<H>  /  ALT  46<H>  /  AlkPhos  34<L>        Urinalysis Basic - ( 2022 19:38 )    Color: Light Yellow / Appearance: Clear / S.025 / pH: x  Gluc: x / Ketone: Negative  / Bili: Negative / Urobili: Negative   Blood: x / Protein: Trace / Nitrite: Negative   Leuk Esterase: Negative / RBC: 4 /hpf / WBC 3 /HPF   Sq Epi: x / Non Sq Epi: 1 /hpf / Bacteria: Negative

## 2022-02-22 NOTE — PROVIDER CONTACT NOTE (OTHER) - ASSESSMENT
Pt AOx4, no chills noted. Pt temp 101, other VSS. No acute distress ordered. Pt already received IV abx and blood cultures done

## 2022-02-22 NOTE — PROGRESS NOTE ADULT - PROBLEM SELECTOR PLAN 1
-U/A unremarkable but imaging findings, fever and leukocytosis highly suspicious for acute pyelonephritis   -s/p ceftriaxone x1 in ED  -c/w ceftriaxone 1g daily   -f/u urine cultures  -obtain blader scan  -monitor I/O's  -Tylenol PRN for fever or pain -U/A unremarkable but imaging findings, fever and leukocytosis highly suspicious for acute pyelonephritis   -s/p ceftriaxone daily,  f/u urine cultures, Tylenol PRN for fever or pain.     Added Flomax to Proscar as enlarged prostate noted on CT. CT notes bilateral pylo, no renal abscess, no   renal calculus. IVF for another 24 hours then stop.     Discharge home Wednesday if afebrile and tolerating PO diet.

## 2022-02-22 NOTE — PATIENT PROFILE ADULT - FALL HARM RISK - HARM RISK INTERVENTIONS

## 2022-02-22 NOTE — PATIENT PROFILE ADULT - PACKS YRS CALCULATION
Patient was scheduled today to see MD Correa, however provider is out. Patient has a new upcoming appt   3/28/2022 7:30 AM Fer Correa MD [facility]     Patient verbalized understanding.    22

## 2022-02-22 NOTE — PATIENT PROFILE ADULT - OVER THE PAST TWO WEEKS HAVE YOU FELT DOWN, DEPRESSED OR HOPELESS?
Progress Notes by Anais Renteria MS, MA, CCC-SLP/L at 17 06:25 PM     Author:  Anais Renteria MS, MA, CCC-SLP/L Service:  (none) Author Type:  Therapist     Filed:  17 06:30 PM Encounter Date:  2017 Status:  Signed     :  Anais Renteria MS, MA, CCC-SLP/L (Therapist)            PEDIATRIC SPEECH-LANGUAGE THERAPY DAILY NOTE  Patient's name: Jhony Morales  : 2012    MRN: [id number]     Dx: Expressive language disorder [F80.1] and Articulation disorder [F80.0] Date:[AB1.1C] 17[AB1.2T]             Insurance Issues: [employer]    Attendance: Patient has been seen for 3[AB1.1C]2[AB1.1M] visits between today and 17.      Last Progress Report: 17,[AB1.1C] 2[AB1.1M] visits since.    Subjective Information:    Jhony[AB1.1C] had difficulty  from his mother at the beginning of the session.  Mother reports that she moved their psychologist's appointment up to Tuesday in hopes that they can help with his attention, possibly through medication.  During the session, Jhony indicated that he had to use the bathroom, but the request may have partially been to see his mother again as he ran to the [employer] to get her though she reports that he can go to the bathroom on his own.[AB1.1M]     Modalities: expressive language, speech production    Patient's Goal:   LTG: Jhony will increase his expressive language skills to an age appropriate level so that he can effectively communicate with others across environments.   STG Task/Activity Response/Progress   Jhony will answer who, what, and where questions each with 80% accuracy.   Language:  Expressive Language Task, Describe objects, Model from SLP, Provide Choices, Visual cues, Verbal cues 17   Who (with visual choices in GoodGuide game)   Expressively labeled: teacher, neighbor    Was not able to expressively label: , spaceman, magician, , , farmer, , , but  did identify some of the receptively.           Long-Term Goal #2:  Jhony will increase his speech intelligibility so that he can effectivly communicate with others.  STG  Task/Activity  Response/Progress    Jhony will produce /f, v/ in all positions of words with 80% accuracy in conversation across 3 consecutive therapy sessions.  Speech: Modeling from SLP, games with elicitation, target specific phonemes, picture cards, visual cues, verbal cues, auditory discrimination, minimal pairs Goal met for /f/.  Extend goal to conversational level. Not yet met for /v/  08/29/17   TV: 3/5 (/f/ was counted as correct for /v/, /b/ was counted as an error).    08/01/17   /v/ initial word level:  7/10 = 70%    05/23/17   /v/ initial word level.    Inconsistent with models and placement cues. Difficulty attending to therapist's face.      Errors included /b/ and /f/       Jhony will produce 3-syllable words with 80% accuracy across 3 consecutive therapy sessions.  Speech: Modeling from SLP, games with elicitation, target specific phonemes, picture cards, visual cues, verbal cues, auditory discrimination, minimal pairs GOAL MET.  Extend goal to 3 and 4-syllable words in sentences.[AB1.1C]     09/26/17[AB1.3T]   Four syllable words with visual segmentation:[AB1.1C]  29/40 = 73%[AB1.1M]    08/29/17   Four syllable words with visual segmentation:  Caterpillar: 6/10 = 60%  Binoculars: 8/10 = 80%  Triceratops: 7/10 = 70%  : 6/10 = 60%  Total: 27/40 = 68%    08/22/17   four syllable words  Jhony made errors on: triceratops, binoculars, , and caterpillar. However, he responded to cues to fix them.  He had the most difficulty with \"\" producing it as \"fighter fighter\" even after pictures of \"fire\" and \"fighter\" were introduced to aid in segmentation and differentiation.      He was accurate for the other 8 words.    8/12 = 75%      08/01/17   3 syllable words at the word level: 15/15 = 100%    4  syllable words at the word level: 11/12 = 92%    06/27/17  Following clinician model at the carrier phrase level and gesture cue:  19/25 = 76%    06/20/17  Following clinician model at the word level   27/30 =  90%     06/06/17   Following instruction and clinician model at the word level:   22/24 = 92%    05/30/17   Marking the consonants within syllables:  Umbrella: 2/7  Lollipop: 1/4  Cheeseburger: 1/2  Screwdriver: 2/2  Caterpillar: 3/3  Octopus: 0/4   Total: 9/22 = 41%        05/16/17   [place]: 5/5   Medicine: 2/5 = 40%  Tricycle: 8/10 = 80%  Grasshopper: 5/5 = 100%   Lemonade: 4/5 = 80%  Umbrella: 5/5 = 100%  Lollipop: 5/5 = 100%  Telephone: 3/5 = 60%  Spaghetti: 5/5 = 100%  Bubblegum: 5/5 = 100%  Pajamas: 4/5 = 80%  Envelope: 5/5 = 100%  Octopus: 0/3 = 0%  Lebeau: 2/5 = 40%  Calendar: 3/5 = 60%  Computer: 5/5 = 100%  Banana: 4/5 = 80%  Butterfly: 5/5 = 100%  Strawberry: 3/5 = 60%  Elephant: 5/8 = 63%  Total 82/106 = 77%     Jhony will not reduce consonant clusters by producing blends at the word, phrase, and sentence levels with 80% accuracy over three consecutive sessions.     Skills emerging.  Discontinue goal.   08/08/17   S blends appeared to be emerging in conversation.  In carrier sentences /s/ blends were 100% accurate for ST, SK, SP, and SN.  However, Jhony had errors on SW (3/5 with model).     07/25/17  Word level:  /sw/ blends: 7/10 =70%  /sl/ blends: 6/10 = 60%    /sn/, /st/, /sp/, and /sk/ blends:18/20 = 90%    07/18/17  S blends at the word level:  Snake: 5/5 =100%  Stop: 5/5 = 100%  Spider: 5/5 = 100%  Sweater: 9/15 = 60%  Skies: 4/5 = 80%  Skate: 3/5 = 60%  Slipper (l/w) = 8/15 = 53%  Total: 44/55 = 80%     05/09/17   ST initial at the word level: 26/35 =  74%    04/25/17   S blends  SK: 10/10 = 100%  SL: 6/10 = 60%  SM: 10/10 = 100%  SN: 8/10 = 80%  SP: 17/20 = 85%  ST: 16/20 = 80%  SW: 17/28 = 67%  Total: 84/108 = 78%    04/18/17   S blends baseline  SK: 5/5  SL: 0/5  SM: 4/5  SN:  4/5  SP: 4/5  ST: 0/5  SW: 0/5  Total: 13/35 = 37%    Cluster reduction errors were also noted on /r/ blends.     New goal  Assess language to determine additional needs.     Prognosis for goals good.      Plan: Continue plan of care. Patient will be seen 1 times per week for a period of 12 weeks.  Treatment will consist of expressive language, speech production therapy.      Goals: Discontinue goals noted.  Continue and extend remaining goals.  Add goal for further assessment. Treatment plan discussed with mother and parent agreed.     Home program: Patient and family issued written home program with discussion provided, demonstration given, activities suggested:[AB1.1C] 4 syllable words with visuals and segmentation.[AB1.1M]  Parent provided with handouts.  Parent voiced understanding.     Therapist’s signature:[AB1.1C] Electronically Signed by:    Anais Renteria MS, MA, CCC-SLP/L , 9/26/2017[AB1.1T]              Revision History        User Key Date/Time User Provider Type Action    > AB1.3 09/26/17 06:30 PM Anais Renteria MS, MA, CCC-SLP/L Therapist Sign     AB1.2 09/26/17 06:26 PM Anais Renteria MS, MA, CCC-SLP/L Therapist      AB1.1 09/26/17 06:25 PM Anais Renteria MS, MA, CCC-SLP/L Therapist     C - Copied, M - Manual, T - Template             no

## 2022-02-23 ENCOUNTER — TRANSCRIPTION ENCOUNTER (OUTPATIENT)
Age: 62
End: 2022-02-23

## 2022-02-23 VITALS — WEIGHT: 193.35 LBS

## 2022-02-23 PROCEDURE — G0378: CPT

## 2022-02-23 PROCEDURE — 82010 KETONE BODYS QUAN: CPT

## 2022-02-23 PROCEDURE — 96361 HYDRATE IV INFUSION ADD-ON: CPT

## 2022-02-23 PROCEDURE — 82330 ASSAY OF CALCIUM: CPT

## 2022-02-23 PROCEDURE — 71046 X-RAY EXAM CHEST 2 VIEWS: CPT

## 2022-02-23 PROCEDURE — 84100 ASSAY OF PHOSPHORUS: CPT

## 2022-02-23 PROCEDURE — 96374 THER/PROPH/DIAG INJ IV PUSH: CPT

## 2022-02-23 PROCEDURE — 87040 BLOOD CULTURE FOR BACTERIA: CPT

## 2022-02-23 PROCEDURE — 80053 COMPREHEN METABOLIC PANEL: CPT

## 2022-02-23 PROCEDURE — 82435 ASSAY OF BLOOD CHLORIDE: CPT

## 2022-02-23 PROCEDURE — 85025 COMPLETE CBC W/AUTO DIFF WBC: CPT

## 2022-02-23 PROCEDURE — 84295 ASSAY OF SERUM SODIUM: CPT

## 2022-02-23 PROCEDURE — 82803 BLOOD GASES ANY COMBINATION: CPT

## 2022-02-23 PROCEDURE — 86803 HEPATITIS C AB TEST: CPT

## 2022-02-23 PROCEDURE — 82947 ASSAY GLUCOSE BLOOD QUANT: CPT

## 2022-02-23 PROCEDURE — 85018 HEMOGLOBIN: CPT

## 2022-02-23 PROCEDURE — 0225U NFCT DS DNA&RNA 21 SARSCOV2: CPT

## 2022-02-23 PROCEDURE — 83605 ASSAY OF LACTIC ACID: CPT

## 2022-02-23 PROCEDURE — 84132 ASSAY OF SERUM POTASSIUM: CPT

## 2022-02-23 PROCEDURE — 81001 URINALYSIS AUTO W/SCOPE: CPT

## 2022-02-23 PROCEDURE — 87086 URINE CULTURE/COLONY COUNT: CPT

## 2022-02-23 PROCEDURE — 99285 EMERGENCY DEPT VISIT HI MDM: CPT | Mod: 25

## 2022-02-23 PROCEDURE — 74177 CT ABD & PELVIS W/CONTRAST: CPT | Mod: MA

## 2022-02-23 PROCEDURE — 83735 ASSAY OF MAGNESIUM: CPT

## 2022-02-23 PROCEDURE — 85014 HEMATOCRIT: CPT

## 2022-02-23 PROCEDURE — 36415 COLL VENOUS BLD VENIPUNCTURE: CPT

## 2022-02-23 RX ORDER — TAMSULOSIN HYDROCHLORIDE 0.4 MG/1
1 CAPSULE ORAL
Qty: 0 | Refills: 0 | DISCHARGE
Start: 2022-02-23

## 2022-02-23 RX ORDER — CEFUROXIME AXETIL 250 MG
1 TABLET ORAL
Qty: 14 | Refills: 0
Start: 2022-02-23 | End: 2022-03-01

## 2022-02-23 RX ORDER — TAMSULOSIN HYDROCHLORIDE 0.4 MG/1
1 CAPSULE ORAL
Qty: 30 | Refills: 0
Start: 2022-02-23 | End: 2022-03-24

## 2022-02-23 RX ORDER — LORATADINE 10 MG/1
1 TABLET ORAL
Qty: 0 | Refills: 0 | DISCHARGE

## 2022-02-23 RX ADMIN — SODIUM CHLORIDE 75 MILLILITER(S): 9 INJECTION, SOLUTION INTRAVENOUS at 09:00

## 2022-02-23 RX ADMIN — SODIUM CHLORIDE 75 MILLILITER(S): 9 INJECTION, SOLUTION INTRAVENOUS at 07:00

## 2022-02-23 RX ADMIN — BALSALAZIDE DISODIUM 2250 MILLIGRAM(S): 750 CAPSULE ORAL at 13:35

## 2022-02-23 RX ADMIN — SODIUM CHLORIDE 75 MILLILITER(S): 9 INJECTION, SOLUTION INTRAVENOUS at 10:00

## 2022-02-23 RX ADMIN — SODIUM CHLORIDE 75 MILLILITER(S): 9 INJECTION, SOLUTION INTRAVENOUS at 05:18

## 2022-02-23 RX ADMIN — Medication 500 MILLIGRAM(S): at 12:44

## 2022-02-23 RX ADMIN — SODIUM CHLORIDE 75 MILLILITER(S): 9 INJECTION, SOLUTION INTRAVENOUS at 08:00

## 2022-02-23 RX ADMIN — FINASTERIDE 5 MILLIGRAM(S): 5 TABLET, FILM COATED ORAL at 12:44

## 2022-02-23 RX ADMIN — BALSALAZIDE DISODIUM 2250 MILLIGRAM(S): 750 CAPSULE ORAL at 05:17

## 2022-02-23 NOTE — PROGRESS NOTE ADULT - PROBLEM SELECTOR PLAN 1
-U/A unremarkable but imaging findings, fever and leukocytosis highly suspicious for acute pyelonephritis   -s/p ceftriaxone daily,  f/u urine cultures, Tylenol PRN for fever or pain.     Added Flomax to Proscar as enlarged prostate noted on CT. CT notes bilateral pylo, no renal abscess, no   renal calculus. IVF for another 24 hours then stop.     Discharge home Wednesday if afebrile and tolerating PO diet. -U/A unremarkable but imaging findings, fever and leukocytosis highly suspicious for acute pyelonephritis   -s/p ceftriaxone daily,  f/u urine cultures, Tylenol PRN for fever or pain.     Added Flomax to Proscar as enlarged prostate noted on CT. CT notes bilateral pylo, no renal abscess, no   renal calculus. IVF for another 24 hours then stop.     Discharge home Wednesday if afebrile and tolerating PO diet.    Can change to PO Ceftin for 7 more days, check PVR prior to discharge.

## 2022-02-23 NOTE — DISCHARGE NOTE PROVIDER - NSDCMRMEDTOKEN_GEN_ALL_CORE_FT
Colazal 750 mg oral capsule: 3 cap(s) orally 3 times a day  Crestor 10 mg oral tablet: 1 tab(s) orally once a day (at bedtime)  finasteride 5 mg oral tablet: 1 tab(s) orally once a day  Multiple Vitamins oral tablet: 1 tab(s) orally once a day  tamsulosin 0.4 mg oral capsule: 1 cap(s) orally once a day (at bedtime)  Vitamin C 100 mg oral tablet: 1 tab(s) orally once a day   cefuroxime 500 mg oral tablet: 1 tab(s) orally every 12 hours   Colazal 750 mg oral capsule: 3 cap(s) orally 3 times a day  Crestor 10 mg oral tablet: 1 tab(s) orally once a day (at bedtime)  finasteride 5 mg oral tablet: 1 tab(s) orally once a day  Multiple Vitamins oral tablet: 1 tab(s) orally once a day  tamsulosin 0.4 mg oral capsule: 1 cap(s) orally once a day (at bedtime)  Vitamin C 100 mg oral tablet: 1 tab(s) orally once a day

## 2022-02-23 NOTE — DISCHARGE NOTE PROVIDER - CARE PROVIDER_API CALL
Raffi Caban)  [facility]  [phone] [place] [address] [id number]  Phone: [phone]  Fax: [phone]  Established Patient  Follow Up Time: 2 weeks

## 2022-02-23 NOTE — DISCHARGE NOTE NURSING/CASE MANAGEMENT/SOCIAL WORK - PATIENT PORTAL LINK FT
You can access the FollowMyHealth Patient Portal offered by [facility] by registering at the following website: [email or web address]. By joining [facility]’s FollowMyHealth portal, you will also be able to view your health information using other applications (apps) compatible with our system.

## 2022-02-23 NOTE — PROGRESS NOTE ADULT - SUBJECTIVE AND OBJECTIVE BOX
INTERVAL HPI/OVERNIGHT EVENTS:  Pt seen and examined at bedside.     Allergies/Intolerance: No Known Allergies      MEDICATIONS  (STANDING):  ascorbic acid 500 milliGRAM(s) Oral daily  atorvastatin 40 milliGRAM(s) Oral at bedtime  balsalazide 2250 milliGRAM(s) Oral three times a day  cefTRIAXone   IVPB 1000 milliGRAM(s) IV Intermittent every 24 hours  finasteride 5 milliGRAM(s) Oral daily  influenza   Vaccine 0.5 milliLiter(s) IntraMuscular once  lactated ringers. 1000 milliLiter(s) (75 mL/Hr) IV Continuous <Continuous>  tamsulosin 0.4 milliGRAM(s) Oral at bedtime    MEDICATIONS  (PRN):  acetaminophen     Tablet .. 650 milliGRAM(s) Oral every 6 hours PRN Temp greater or equal to 38C (100.4F), Mild Pain (1 - 3), Moderate Pain (4 - 6)        ROS: all systems reviewed and wnl      PHYSICAL EXAMINATION:  Vital Signs Last 24 Hrs  T(C): 37.1 (2022 07:48), Max: 37.2 (2022 23:43)  T(F): 98.7 (2022 07:48), Max: 99 (2022 23:43)  HR: 64 (2022 07:48) (64 - 79)  BP: 109/72 (2022 07:48) (108/63 - 113/72)  BP(mean): --  RR: 18 (2022 07:48) (18 - 18)  SpO2: 97% (2022 07:48) (96% - 99%)  CAPILLARY BLOOD GLUCOSE           @ 07:01  -   @ 09:58  --------------------------------------------------------  IN: 280 mL / OUT: 0 mL / NET: 280 mL        GENERAL: stable, no fevers, on regular diet, will stop IVF later today.   NECK: supple, No JVD  CHEST/LUNG: clear to auscultation bilaterally; no rales, rhonchi, or wheezing b/l  HEART: normal S1, S2  ABDOMEN: BS+, soft, ND, NT   EXTREMITIES:  pulses palpable; no clubbing, cyanosis, or edema b/l LEs  SKIN: no rashes or lesions      LABS:                        12.6   9.88  )-----------( 222      ( 2022 06:55 )             37.5         135  |  100  |  17  ----------------------------<  92  3.9   |  23  |  1.02    Ca    9.0      2022 06:55  Phos  4.0       Mg     2.3         TPro  6.6  /  Alb  3.3  /  TBili  0.4  /  DBili  x   /  AST  55<H>  /  ALT  46<H>  /  AlkPhos  34<L>        Urinalysis Basic - ( 2022 19:38 )    Color: Light Yellow / Appearance: Clear / S.025 / pH: x  Gluc: x / Ketone: Negative  / Bili: Negative / Urobili: Negative   Blood: x / Protein: Trace / Nitrite: Negative   Leuk Esterase: Negative / RBC: 4 /hpf / WBC 3 /HPF   Sq Epi: x / Non Sq Epi: 1 /hpf / Bacteria: Negative

## 2022-02-23 NOTE — DISCHARGE NOTE PROVIDER - HOSPITAL COURSE
61yo M w/ PMHx of Ulcerative Colitis, HLD presents with fever and diarrhea, found to have imaging findings concerning for pyelonephritis      Problem/Plan - 1:  ·  Problem: Acute pyelonephritis.   ·  Plan: -U/A unremarkable but imaging findings, fever and leukocytosis highly suspicious for acute pyelonephritis   -s/p ceftriaxone daily,  f/u urine cultures, Tylenol PRN for fever or pain.     Added Flomax to Proscar as enlarged prostate noted on CT. CT notes bilateral pylo, no renal abscess, no   renal calculus. IVF for another 24 hours then stop.     Bladder scan 73cc urine; pt states no difficulty in urinating     Discharge home Wednesday if afebrile and tolerating PO diet.     Problem/Plan - 2:  ·  Problem: Ulcerative colitis.   ·  Plan: -c/w home Colazal tid.     Problem/Plan - 3:  ·  Problem: Hyperlipidemia.   ·  Plan: -c/w home statin.    Reviewed case with attending and pt is cleared for discharge and follow up in 2 weeks with his primary care provider. Antibiotics sent to pharmacy. Discussed with pt and all opportunity given to answer any questions. Pt to follow up with his primary care doctor Dr Caban and will seek an appointment with the urologist in his town that is used by his brother 61yo male PMH of Ulcerative Colitis, HLD presents with fever and diarrhea, found to have imaging findings concerning for pyelonephritis.       Problem/Plan - 1:  Problem: Acute pyelonephritis. U/A unremarkable but imaging findings, fever and leukocytosis highly suspicious for acute pyelonephritis. See enclosed   CT report. s/p Ceftriaxone daily,  urine cultures < 10,000 CFU, blood cultures negative. Tylenol PRN for fever or pain.  Added Flomax to Proscar as enlarged prostate noted on CT. CT notes bilateral pylo, no renal abscess, no renal calculus. IVF for another 24 hours then stop. Bladder scan 73 cc post void residual. Patient  states no difficulty in urinating now.  Discharged home Wednesday, see med list. CXR clear, COVID negative.      Problem/Plan - 2:  ·  Problem: Ulcerative colitis, stable, c/w home Colazal tid.     Problem/Plan - 3:  ·  Problem: Hyperlipidemia, c/w home statin.    See med list. 63yo male PMH of Ulcerative Colitis, HLD presents with fever and diarrhea, found to have imaging findings concerning for pyelonephritis.       Problem/Plan - 1:  Problem: Acute pyelonephritis. U/A unremarkable but imaging findings, fever and leukocytosis highly suspicious for acute pyelonephritis. See enclosed   CT report. s/p Ceftriaxone daily,  urine cultures < 10,000 CFU, blood cultures negative. Tylenol PRN for fever or pain.  Added Flomax to Proscar as enlarged prostate noted on CT. CT notes bilateral pylo, no renal abscess, no renal calculus. IVF for another 24 hours then stop. Bladder scan 73 cc post void residual. Patient  states no difficulty in urinating now.      Discharged home Wednesday, see med list. CXR clear, COVID negative.      Problem/Plan - 2:  ·  Problem: Ulcerative colitis, stable, c/w home Colazal tid.     Problem/Plan - 3:  ·  Problem: Hyperlipidemia, c/w home statin.    See med list. Complete course of Ceftin at home. 61yo male PMH of Ulcerative Colitis, HLD presents with fever and diarrhea, found to have imaging findings concerning for pyelonephritis.       Problem/Plan - 1:  Problem: Acute pyelonephritis. U/A unremarkable but imaging findings, fever and leukocytosis highly suspicious for acute pyelonephritis. See enclosed   CT report. s/p Ceftriaxone daily,  urine cultures < 10,000 CFU, blood cultures negative. Tylenol PRN for fever or pain.  Added Flomax to Proscar as enlarged prostate noted on CT. CT notes bilateral pylo, no renal abscess, no renal calculus. IVF for another 24 hours then stop. Bladder scan 73 cc post void residual. Patient  states no difficulty in urinating now.      Discharged home Wednesday, see med list. CXR clear, COVID negative.      Problem/Plan - 2:  ·  Problem: Ulcerative colitis, stable, c/w home Colazal tid.     Problem/Plan - 3:  ·  Problem: Hyperlipidemia, c/w home statin.    See med list. Complete course of Ceftin at home.      Addendum:  Sepsis present on arrival from pylonephritis.

## 2022-02-23 NOTE — DISCHARGE NOTE PROVIDER - NSDCCPCAREPLAN_GEN_ALL_CORE_FT
PRINCIPAL DISCHARGE DIAGNOSIS  Diagnosis: Pyelonephritis  Assessment and Plan of Treatment: Although your urinalysis was unremarkable, workup points to pyelonephritis. You were treated in the hospital with antibiotics and will continue these for the next day as outpatient. This will be sent to your pharmacy. You can take tylenol 650mg every 6 hours for any pain or fever. It is important to complete all antibiotics.   You have an enlarged prostate so Added Flomax to Proscar. This was noted on the CT scan. As you are now tolerating your diet well and have not had any fever you will be discharged. Please notifty your provider if you have any fever. You should make an appointment with your provider for 2 weeks from    You should make an appointment to see your brother's urologist for the enlargement of your prostate      SECONDARY DISCHARGE DIAGNOSES  Diagnosis: Hyperlipidemia  Assessment and Plan of Treatment: Low fat diet, exercise daily and continue current medications. Follow up with primary care physician and cardiologist for management.    Diagnosis: Ulcerative colitis  Assessment and Plan of Treatment: Please continue to take your medications as prescribed and follow up with your GI doctor

## 2022-02-23 NOTE — PROGRESS NOTE ADULT - ASSESSMENT
63yo M w/ PMHx of Ulcerative Colitis, HLD presents with fever and diarrhea, found to have imaging findings concerning for pyelonephritis 
61yo M w/ PMHx of Ulcerative Colitis, HLD presents with fever and diarrhea, found to have imaging findings concerning for pyelonephritis

## 2022-02-26 LAB
CULTURE RESULTS: SIGNIFICANT CHANGE UP
CULTURE RESULTS: SIGNIFICANT CHANGE UP
SPECIMEN SOURCE: SIGNIFICANT CHANGE UP
SPECIMEN SOURCE: SIGNIFICANT CHANGE UP

## 2022-03-29 ENCOUNTER — INPATIENT (INPATIENT)
Facility: HOSPITAL | Age: 62
LOS: 4 days | Discharge: HOME | DRG: 872 | End: 2022-04-03
Attending: INTERNAL MEDICINE | Admitting: INTERNAL MEDICINE
Payer: COMMERCIAL

## 2022-03-29 VITALS
RESPIRATION RATE: 17 BRPM | HEIGHT: 69 IN | DIASTOLIC BLOOD PRESSURE: 62 MMHG | HEART RATE: 108 BPM | OXYGEN SATURATION: 99 % | TEMPERATURE: 100 F | SYSTOLIC BLOOD PRESSURE: 105 MMHG

## 2022-03-29 DIAGNOSIS — N17.9 ACUTE KIDNEY FAILURE, UNSPECIFIED: ICD-10-CM

## 2022-03-29 DIAGNOSIS — Z98.890 OTHER SPECIFIED POSTPROCEDURAL STATES: Chronic | ICD-10-CM

## 2022-03-29 DIAGNOSIS — Z90.49 ACQUIRED ABSENCE OF OTHER SPECIFIED PARTS OF DIGESTIVE TRACT: Chronic | ICD-10-CM

## 2022-03-29 LAB
ALBUMIN SERPL ELPH-MCNC: 4.4 G/DL — SIGNIFICANT CHANGE UP (ref 3.3–5)
ALP SERPL-CCNC: 37 U/L — LOW (ref 40–120)
ALT FLD-CCNC: 11 U/L — SIGNIFICANT CHANGE UP (ref 10–45)
ANION GAP SERPL CALC-SCNC: 17 MMOL/L — SIGNIFICANT CHANGE UP (ref 5–17)
APPEARANCE UR: ABNORMAL
AST SERPL-CCNC: 23 U/L — SIGNIFICANT CHANGE UP (ref 10–40)
BACTERIA # UR AUTO: NEGATIVE — SIGNIFICANT CHANGE UP
BASE EXCESS BLDV CALC-SCNC: -1.6 MMOL/L — SIGNIFICANT CHANGE UP (ref -2–2)
BASOPHILS # BLD AUTO: 0.02 K/UL — SIGNIFICANT CHANGE UP (ref 0–0.2)
BASOPHILS NFR BLD AUTO: 0.1 % — SIGNIFICANT CHANGE UP (ref 0–2)
BILIRUB SERPL-MCNC: 0.5 MG/DL — SIGNIFICANT CHANGE UP (ref 0.2–1.2)
BILIRUB UR-MCNC: NEGATIVE — SIGNIFICANT CHANGE UP
BUN SERPL-MCNC: 24 MG/DL — HIGH (ref 7–23)
CA-I SERPL-SCNC: 1.24 MMOL/L — SIGNIFICANT CHANGE UP (ref 1.15–1.33)
CALCIUM SERPL-MCNC: 9.9 MG/DL — SIGNIFICANT CHANGE UP (ref 8.4–10.5)
CHLORIDE BLDV-SCNC: 97 MMOL/L — SIGNIFICANT CHANGE UP (ref 96–108)
CHLORIDE SERPL-SCNC: 97 MMOL/L — SIGNIFICANT CHANGE UP (ref 96–108)
CO2 BLDV-SCNC: 26 MMOL/L — SIGNIFICANT CHANGE UP (ref 22–26)
CO2 SERPL-SCNC: 20 MMOL/L — LOW (ref 22–31)
COLOR SPEC: YELLOW — SIGNIFICANT CHANGE UP
CREAT SERPL-MCNC: 1.83 MG/DL — HIGH (ref 0.5–1.3)
DIFF PNL FLD: ABNORMAL
EGFR: 41 ML/MIN/1.73M2 — LOW
EOSINOPHIL # BLD AUTO: 0 K/UL — SIGNIFICANT CHANGE UP (ref 0–0.5)
EOSINOPHIL NFR BLD AUTO: 0 % — SIGNIFICANT CHANGE UP (ref 0–6)
EPI CELLS # UR: 3 /HPF — SIGNIFICANT CHANGE UP
FLUAV AG NPH QL: SIGNIFICANT CHANGE UP
FLUBV AG NPH QL: SIGNIFICANT CHANGE UP
GAS PNL BLDV: 131 MMOL/L — LOW (ref 136–145)
GAS PNL BLDV: SIGNIFICANT CHANGE UP
GLUCOSE BLDV-MCNC: 119 MG/DL — HIGH (ref 70–99)
GLUCOSE SERPL-MCNC: 122 MG/DL — HIGH (ref 70–99)
GLUCOSE UR QL: NEGATIVE — SIGNIFICANT CHANGE UP
GRAN CASTS # UR COMP ASSIST: 1 /LPF — SIGNIFICANT CHANGE UP
HCO3 BLDV-SCNC: 24 MMOL/L — SIGNIFICANT CHANGE UP (ref 22–29)
HCT VFR BLD CALC: 44.7 % — SIGNIFICANT CHANGE UP (ref 39–50)
HCT VFR BLDA CALC: 46 % — SIGNIFICANT CHANGE UP (ref 39–51)
HGB BLD CALC-MCNC: 15.3 G/DL — SIGNIFICANT CHANGE UP (ref 12.6–17.4)
HGB BLD-MCNC: 14.6 G/DL — SIGNIFICANT CHANGE UP (ref 13–17)
HYALINE CASTS # UR AUTO: 72 /LPF — HIGH (ref 0–2)
IMM GRANULOCYTES NFR BLD AUTO: 0.6 % — SIGNIFICANT CHANGE UP (ref 0–1.5)
KETONES UR-MCNC: NEGATIVE — SIGNIFICANT CHANGE UP
LACTATE BLDV-MCNC: 1.9 MMOL/L — SIGNIFICANT CHANGE UP (ref 0.7–2)
LEUKOCYTE ESTERASE UR-ACNC: NEGATIVE — SIGNIFICANT CHANGE UP
LIDOCAIN IGE QN: 43 U/L — SIGNIFICANT CHANGE UP (ref 7–60)
LYMPHOCYTES # BLD AUTO: 0.97 K/UL — LOW (ref 1–3.3)
LYMPHOCYTES # BLD AUTO: 5.7 % — LOW (ref 13–44)
MCHC RBC-ENTMCNC: 30.2 PG — SIGNIFICANT CHANGE UP (ref 27–34)
MCHC RBC-ENTMCNC: 32.7 GM/DL — SIGNIFICANT CHANGE UP (ref 32–36)
MCV RBC AUTO: 92.4 FL — SIGNIFICANT CHANGE UP (ref 80–100)
MONOCYTES # BLD AUTO: 1.16 K/UL — HIGH (ref 0–0.9)
MONOCYTES NFR BLD AUTO: 6.9 % — SIGNIFICANT CHANGE UP (ref 2–14)
NEUTROPHILS # BLD AUTO: 14.67 K/UL — HIGH (ref 1.8–7.4)
NEUTROPHILS NFR BLD AUTO: 86.7 % — HIGH (ref 43–77)
NITRITE UR-MCNC: NEGATIVE — SIGNIFICANT CHANGE UP
NRBC # BLD: 0 /100 WBCS — SIGNIFICANT CHANGE UP (ref 0–0)
PCO2 BLDV: 44 MMHG — SIGNIFICANT CHANGE UP (ref 42–55)
PH BLDV: 7.35 — SIGNIFICANT CHANGE UP (ref 7.32–7.43)
PH UR: 5.5 — SIGNIFICANT CHANGE UP (ref 5–8)
PLATELET # BLD AUTO: 193 K/UL — SIGNIFICANT CHANGE UP (ref 150–400)
PO2 BLDV: 18 MMHG — LOW (ref 25–45)
POTASSIUM BLDV-SCNC: 3.7 MMOL/L — SIGNIFICANT CHANGE UP (ref 3.5–5.1)
POTASSIUM SERPL-MCNC: 4.1 MMOL/L — SIGNIFICANT CHANGE UP (ref 3.5–5.3)
POTASSIUM SERPL-SCNC: 4.1 MMOL/L — SIGNIFICANT CHANGE UP (ref 3.5–5.3)
PROT SERPL-MCNC: 8.3 G/DL — SIGNIFICANT CHANGE UP (ref 6–8.3)
PROT UR-MCNC: ABNORMAL
RBC # BLD: 4.84 M/UL — SIGNIFICANT CHANGE UP (ref 4.2–5.8)
RBC # FLD: 14.6 % — HIGH (ref 10.3–14.5)
RBC CASTS # UR COMP ASSIST: 3 /HPF — SIGNIFICANT CHANGE UP (ref 0–4)
RSV RNA NPH QL NAA+NON-PROBE: SIGNIFICANT CHANGE UP
SAO2 % BLDV: 19.3 % — LOW (ref 67–88)
SARS-COV-2 RNA SPEC QL NAA+PROBE: SIGNIFICANT CHANGE UP
SODIUM SERPL-SCNC: 134 MMOL/L — LOW (ref 135–145)
SP GR SPEC: 1.02 — SIGNIFICANT CHANGE UP (ref 1.01–1.02)
UROBILINOGEN FLD QL: NEGATIVE — SIGNIFICANT CHANGE UP
WBC # BLD: 16.92 K/UL — HIGH (ref 3.8–10.5)
WBC # FLD AUTO: 16.92 K/UL — HIGH (ref 3.8–10.5)
WBC UR QL: 6 /HPF — HIGH (ref 0–5)

## 2022-03-29 PROCEDURE — 93010 ELECTROCARDIOGRAM REPORT: CPT | Mod: NC

## 2022-03-29 PROCEDURE — 71045 X-RAY EXAM CHEST 1 VIEW: CPT | Mod: 26

## 2022-03-29 PROCEDURE — 99285 EMERGENCY DEPT VISIT HI MDM: CPT

## 2022-03-29 PROCEDURE — 99223 1ST HOSP IP/OBS HIGH 75: CPT

## 2022-03-29 RX ORDER — ACETAMINOPHEN 500 MG
650 TABLET ORAL ONCE
Refills: 0 | Status: COMPLETED | OUTPATIENT
Start: 2022-03-29 | End: 2022-03-30

## 2022-03-29 RX ORDER — SODIUM CHLORIDE 9 MG/ML
1000 INJECTION INTRAMUSCULAR; INTRAVENOUS; SUBCUTANEOUS ONCE
Refills: 0 | Status: COMPLETED | OUTPATIENT
Start: 2022-03-29 | End: 2022-03-29

## 2022-03-29 RX ADMIN — SODIUM CHLORIDE 1000 MILLILITER(S): 9 INJECTION INTRAMUSCULAR; INTRAVENOUS; SUBCUTANEOUS at 21:30

## 2022-03-29 RX ADMIN — SODIUM CHLORIDE 1000 MILLILITER(S): 9 INJECTION INTRAMUSCULAR; INTRAVENOUS; SUBCUTANEOUS at 20:30

## 2022-03-29 NOTE — H&P ADULT - NSHPPHYSICALEXAM_GEN_ALL_CORE
Vital Signs Last 24 Hrs  T(C): 37.8 (29 Mar 2022 23:49), Max: 37.9 (29 Mar 2022 16:29)  T(F): 100 (29 Mar 2022 23:49), Max: 100.2 (29 Mar 2022 16:29)  HR: 99 (29 Mar 2022 23:49) (85 - 108)  BP: 131/80 (29 Mar 2022 23:49) (105/62 - 131/80)  BP(mean): 98 (29 Mar 2022 19:15) (98 - 98)  RR: 18 (29 Mar 2022 23:49) (17 - 18)  SpO2: 95% (29 Mar 2022 23:49) (95% - 99%)

## 2022-03-29 NOTE — H&P ADULT - NSHPSOCIALHISTORY_GEN_ALL_CORE
reports previously being an alcoholic but quit drinking ~25yrs ago, quit smoking ~20yrs ago, denies recreational drug use, works as a  and notes occasional exposure to asbestos

## 2022-03-29 NOTE — H&P ADULT - HISTORY OF PRESENT ILLNESS
63yo M w/ PMHx of ulcerative colitis and HLD presents with fever, patient was recently admitted 2/2022 to [facility] for similar complaints, was treated for UTI due to imaging findings concerning for acute infection although urine culture was unremarkable, since then the patient reports have scope performed with urology as outpatient, was told he needed a procedure performed on his prostate but was unable to clarify which, he completed course of cefuroxime on prior discharge, he was started on tamsulosin which he has been taking, he presents today for fever, chills and body aches which began 3 days ago, he took his temperature at home which was as high as 102.7, he tried taking tylenol which provided moderate relief, it was associated with nausea/vomiting, poor po intake and diarrhea, there were no aggravating or alleviating factors to his symptoms. In the ed, he was hemodynamically stable, mildly febrile, saturating well on room air, labs were significant for elevated Cr, patient was given tylenol and 1L NS and admitted to general medicine for further management. He denies chest pain, palpitations, hematuria, dysuria, increased urinary frequency, urethral discharge or incontinence.

## 2022-03-29 NOTE — ED ADULT NURSE NOTE - NSIMPLEMENTINTERV_GEN_ALL_ED
Implemented All Universal Safety Interventions:  [place] to call system. Call bell, personal items and telephone within reach. Instruct patient to call for assistance. Room bathroom lighting operational. Non-slip footwear when patient is off stretcher. Physically safe environment: no spills, clutter or unnecessary equipment. Stretcher in lowest position, wheels locked, appropriate side rails in place.

## 2022-03-29 NOTE — H&P ADULT - NSHPLABSRESULTS_GEN_ALL_CORE
Personally reviewed labs, ekg and images Personally reviewed labs, ekg and images    ekg: sinus rhythm

## 2022-03-29 NOTE — ED PROVIDER NOTE - NS ED ROS FT
CONSTITUTIONAL: + fevers  HEENT: no dysphagia  CV: no chest pain  RESP: no SOB  GI: no nausea/vomiting  : no dysuria  DERM: no rash  MSK: + back pain  NEURO: no LOC  ENDO: no diabetes

## 2022-03-29 NOTE — ED PROVIDER NOTE - OBJECTIVE STATEMENT
63 yo male hx of recent ?pyelo p/w fever, intermittent mild back pain, chills x several days.  denies CP, SOB.  no LOC, no dysuria.

## 2022-03-29 NOTE — ED PROVIDER NOTE - PHYSICAL EXAMINATION
Loss GEN: calm, cooperative  EYES: EOM intact  ENT: mucous membranes moist  HEAD: NCAT  CV: regular  RESP: no respiratory distress  ABD: no abdominal distension  MSK: moves all extremities  NEURO: awake, alert, oriented  PSYCH: affect normal  SKIN: warm

## 2022-03-29 NOTE — ED ADULT NURSE NOTE - OBJECTIVE STATEMENT
62y Male presents to the ED c/o fever. Pt states 1 month ago he had a kidney infection. Pt endorses fevers, chills, cold sweats, and vomiting on Sunday into Monday followed by diarrhea Monday into Tuesday. Pt presents to the ED with heart burn and fever. Pt denies, headache, CP, SOB, numbness, tingling, and burning with urination. Pt is A&Ox3. Patient safety maintained, bed is in lowest position, wheels locked, and side rails raised.

## 2022-03-29 NOTE — H&P ADULT - ASSESSMENT
63yo M w/ PMHx of ulcerative colitis and HLD presents with fever 61yo M w/ PMH-thought to have UTI on last admission, cultures were negative, his current U/A has no bacteria, leuk esterase or nitrites, and he has no urinary complaints although he did have prior significant imaging findings consistent with urinary tract infection, so the source of the fever remains unclear   -obtain urine/blood cultures   -repeat CT scan abdomen/pelvis  -met SIRS criteria on arrival (WBC, tachycardia)  -obtain HIV test (patient gave verbal consent)  -obtain fungal blood cultures  -empirically start Zosyn  -obtain ESR, CRP, ANAx of ulcerative colitis and HLD presents with fever

## 2022-03-29 NOTE — ED PROVIDER NOTE - RAPID ASSESSMENT
62 M w/ PMHx of kidney infection referred to ER c/o back pain and fever TMAX 102.7F x 3 days. Reports similar symptoms to past kidney infection. Pt is awake and alert, in mild distress.    **Pt seen in the waiting room via teletriage by Quinten Silveira), documentation completed by Yahir Saavedra. Pt to be sent to main ED for further evaluation - all orders placed to be followed by MD in the main ED**  Scribe Statement: Denis BROOKS Cole (scribe), attest that this documentation has been prepared under the direction and in the presence of Quinten Silveira (MD) 62 M w/ PMHx of kidney infection referred to ER c/o back pain and fever TMAX 102.7F x 3 days. Reports similar symptoms to past kidney infection. Pt is awake and alert, in mild distress.    **Pt seen in the waiting room via teletriage by Quinten Silveira (MD), documentation completed by Yahir Saavedra. Pt to be sent to main ED for further evaluation - all orders placed to be followed by MD in the main ED**  Scribe Statement: IDenis Cole (scribe), attest that this documentation has been prepared under the direction and in the presence of Quinten Silveira (MD)    Pt seen Triage/Tele-Qdoc w scibe. Full eval/H&P to be complteted once pt is transferred to main ED  Quinten Silveira MD, Facep

## 2022-03-29 NOTE — ED PROVIDER NOTE - CLINICAL SUMMARY MEDICAL DECISION MAKING FREE TEXT BOX
labs with DENNY; no evidence of UTI; unclear etiology, ?2/2 insensible losses from fever + volume depletion; urine culture pending admit for further management.

## 2022-03-30 DIAGNOSIS — Z87.19 PERSONAL HISTORY OF OTHER DISEASES OF THE DIGESTIVE SYSTEM: ICD-10-CM

## 2022-03-30 DIAGNOSIS — N40.0 BENIGN PROSTATIC HYPERPLASIA WITHOUT LOWER URINARY TRACT SYMPTOMS: ICD-10-CM

## 2022-03-30 DIAGNOSIS — E78.5 HYPERLIPIDEMIA, UNSPECIFIED: ICD-10-CM

## 2022-03-30 DIAGNOSIS — N17.9 ACUTE KIDNEY FAILURE, UNSPECIFIED: ICD-10-CM

## 2022-03-30 DIAGNOSIS — R50.9 FEVER, UNSPECIFIED: ICD-10-CM

## 2022-03-30 DIAGNOSIS — Z29.9 ENCOUNTER FOR PROPHYLACTIC MEASURES, UNSPECIFIED: ICD-10-CM

## 2022-03-30 LAB
ALBUMIN SERPL ELPH-MCNC: 3.9 G/DL — SIGNIFICANT CHANGE UP (ref 3.3–5)
ALP SERPL-CCNC: 34 U/L — LOW (ref 40–120)
ALT FLD-CCNC: 12 U/L — SIGNIFICANT CHANGE UP (ref 10–45)
ANION GAP SERPL CALC-SCNC: 16 MMOL/L — SIGNIFICANT CHANGE UP (ref 5–17)
AST SERPL-CCNC: 26 U/L — SIGNIFICANT CHANGE UP (ref 10–40)
BASOPHILS # BLD AUTO: 0.02 K/UL — SIGNIFICANT CHANGE UP (ref 0–0.2)
BASOPHILS NFR BLD AUTO: 0.1 % — SIGNIFICANT CHANGE UP (ref 0–2)
BILIRUB SERPL-MCNC: 0.6 MG/DL — SIGNIFICANT CHANGE UP (ref 0.2–1.2)
BUN SERPL-MCNC: 26 MG/DL — HIGH (ref 7–23)
C DIFF GDH STL QL: NEGATIVE — SIGNIFICANT CHANGE UP
C DIFF GDH STL QL: SIGNIFICANT CHANGE UP
CALCIUM SERPL-MCNC: 9.3 MG/DL — SIGNIFICANT CHANGE UP (ref 8.4–10.5)
CHLORIDE SERPL-SCNC: 99 MMOL/L — SIGNIFICANT CHANGE UP (ref 96–108)
CO2 SERPL-SCNC: 18 MMOL/L — LOW (ref 22–31)
CREAT ?TM UR-MCNC: 154 MG/DL — SIGNIFICANT CHANGE UP
CREAT SERPL-MCNC: 1.53 MG/DL — HIGH (ref 0.5–1.3)
CRP SERPL-MCNC: 118 MG/L — HIGH (ref 0–4)
EGFR: 51 ML/MIN/1.73M2 — LOW
EOSINOPHIL # BLD AUTO: 0 K/UL — SIGNIFICANT CHANGE UP (ref 0–0.5)
EOSINOPHIL NFR BLD AUTO: 0 % — SIGNIFICANT CHANGE UP (ref 0–6)
ERYTHROCYTE [SEDIMENTATION RATE] IN BLOOD: 74 MM/HR — HIGH (ref 0–20)
GLUCOSE SERPL-MCNC: 120 MG/DL — HIGH (ref 70–99)
HCT VFR BLD CALC: 41.8 % — SIGNIFICANT CHANGE UP (ref 39–50)
HGB BLD-MCNC: 13.7 G/DL — SIGNIFICANT CHANGE UP (ref 13–17)
HIV 1+2 AB+HIV1 P24 AG SERPL QL IA: SIGNIFICANT CHANGE UP
IMM GRANULOCYTES NFR BLD AUTO: 0.4 % — SIGNIFICANT CHANGE UP (ref 0–1.5)
LYMPHOCYTES # BLD AUTO: 0.93 K/UL — LOW (ref 1–3.3)
LYMPHOCYTES # BLD AUTO: 5.8 % — LOW (ref 13–44)
MAGNESIUM SERPL-MCNC: 1.8 MG/DL — SIGNIFICANT CHANGE UP (ref 1.6–2.6)
MCHC RBC-ENTMCNC: 30.4 PG — SIGNIFICANT CHANGE UP (ref 27–34)
MCHC RBC-ENTMCNC: 32.8 GM/DL — SIGNIFICANT CHANGE UP (ref 32–36)
MCV RBC AUTO: 92.7 FL — SIGNIFICANT CHANGE UP (ref 80–100)
MONOCYTES # BLD AUTO: 1.39 K/UL — HIGH (ref 0–0.9)
MONOCYTES NFR BLD AUTO: 8.7 % — SIGNIFICANT CHANGE UP (ref 2–14)
NEUTROPHILS # BLD AUTO: 13.61 K/UL — HIGH (ref 1.8–7.4)
NEUTROPHILS NFR BLD AUTO: 85 % — HIGH (ref 43–77)
NRBC # BLD: 0 /100 WBCS — SIGNIFICANT CHANGE UP (ref 0–0)
PHOSPHATE SERPL-MCNC: 2.8 MG/DL — SIGNIFICANT CHANGE UP (ref 2.5–4.5)
PLATELET # BLD AUTO: 155 K/UL — SIGNIFICANT CHANGE UP (ref 150–400)
POTASSIUM SERPL-MCNC: 3.9 MMOL/L — SIGNIFICANT CHANGE UP (ref 3.5–5.3)
POTASSIUM SERPL-SCNC: 3.9 MMOL/L — SIGNIFICANT CHANGE UP (ref 3.5–5.3)
PROT SERPL-MCNC: 7.5 G/DL — SIGNIFICANT CHANGE UP (ref 6–8.3)
RBC # BLD: 4.51 M/UL — SIGNIFICANT CHANGE UP (ref 4.2–5.8)
RBC # FLD: 14.8 % — HIGH (ref 10.3–14.5)
SODIUM SERPL-SCNC: 133 MMOL/L — LOW (ref 135–145)
SODIUM UR-SCNC: 36 MMOL/L — SIGNIFICANT CHANGE UP
WBC # BLD: 16.02 K/UL — HIGH (ref 3.8–10.5)
WBC # FLD AUTO: 16.02 K/UL — HIGH (ref 3.8–10.5)

## 2022-03-30 PROCEDURE — 74176 CT ABD & PELVIS W/O CONTRAST: CPT | Mod: 26

## 2022-03-30 RX ORDER — SODIUM CHLORIDE 9 MG/ML
1000 INJECTION INTRAMUSCULAR; INTRAVENOUS; SUBCUTANEOUS ONCE
Refills: 0 | Status: COMPLETED | OUTPATIENT
Start: 2022-03-30 | End: 2022-03-30

## 2022-03-30 RX ORDER — PIPERACILLIN AND TAZOBACTAM 4; .5 G/20ML; G/20ML
3.38 INJECTION, POWDER, LYOPHILIZED, FOR SOLUTION INTRAVENOUS ONCE
Refills: 0 | Status: COMPLETED | OUTPATIENT
Start: 2022-03-30 | End: 2022-03-30

## 2022-03-30 RX ORDER — ROSUVASTATIN CALCIUM 5 MG/1
1 TABLET ORAL
Qty: 0 | Refills: 0 | DISCHARGE

## 2022-03-30 RX ORDER — PIPERACILLIN AND TAZOBACTAM 4; .5 G/20ML; G/20ML
3.38 INJECTION, POWDER, LYOPHILIZED, FOR SOLUTION INTRAVENOUS EVERY 8 HOURS
Refills: 0 | Status: DISCONTINUED | OUTPATIENT
Start: 2022-03-30 | End: 2022-03-30

## 2022-03-30 RX ORDER — ONDANSETRON 8 MG/1
4 TABLET, FILM COATED ORAL EVERY 8 HOURS
Refills: 0 | Status: DISCONTINUED | OUTPATIENT
Start: 2022-03-30 | End: 2022-03-31

## 2022-03-30 RX ORDER — ACETAMINOPHEN 500 MG
1000 TABLET ORAL ONCE
Refills: 0 | Status: COMPLETED | OUTPATIENT
Start: 2022-03-30 | End: 2022-03-30

## 2022-03-30 RX ORDER — ATORVASTATIN CALCIUM 80 MG/1
40 TABLET, FILM COATED ORAL AT BEDTIME
Refills: 0 | Status: DISCONTINUED | OUTPATIENT
Start: 2022-03-30 | End: 2022-04-03

## 2022-03-30 RX ORDER — BALSALAZIDE DISODIUM 750 MG/1
2250 CAPSULE ORAL THREE TIMES A DAY
Refills: 0 | Status: DISCONTINUED | OUTPATIENT
Start: 2022-03-30 | End: 2022-04-03

## 2022-03-30 RX ORDER — ACETAMINOPHEN 500 MG
650 TABLET ORAL EVERY 6 HOURS
Refills: 0 | Status: DISCONTINUED | OUTPATIENT
Start: 2022-03-30 | End: 2022-04-03

## 2022-03-30 RX ORDER — TAMSULOSIN HYDROCHLORIDE 0.4 MG/1
0.4 CAPSULE ORAL AT BEDTIME
Refills: 0 | Status: DISCONTINUED | OUTPATIENT
Start: 2022-03-30 | End: 2022-04-03

## 2022-03-30 RX ORDER — PIPERACILLIN AND TAZOBACTAM 4; .5 G/20ML; G/20ML
3.38 INJECTION, POWDER, LYOPHILIZED, FOR SOLUTION INTRAVENOUS EVERY 8 HOURS
Refills: 0 | Status: DISCONTINUED | OUTPATIENT
Start: 2022-03-30 | End: 2022-04-02

## 2022-03-30 RX ORDER — ASCORBIC ACID 60 MG
500 TABLET,CHEWABLE ORAL DAILY
Refills: 0 | Status: DISCONTINUED | OUTPATIENT
Start: 2022-03-30 | End: 2022-04-03

## 2022-03-30 RX ORDER — HEPARIN SODIUM 5000 [USP'U]/ML
5000 INJECTION INTRAVENOUS; SUBCUTANEOUS EVERY 8 HOURS
Refills: 0 | Status: DISCONTINUED | OUTPATIENT
Start: 2022-03-30 | End: 2022-04-03

## 2022-03-30 RX ORDER — FINASTERIDE 5 MG/1
1 TABLET, FILM COATED ORAL
Qty: 0 | Refills: 0 | DISCHARGE

## 2022-03-30 RX ORDER — ASCORBIC ACID 60 MG
1 TABLET,CHEWABLE ORAL
Qty: 0 | Refills: 0 | DISCHARGE

## 2022-03-30 RX ORDER — SODIUM CHLORIDE 9 MG/ML
1000 INJECTION, SOLUTION INTRAVENOUS
Refills: 0 | Status: DISCONTINUED | OUTPATIENT
Start: 2022-03-30 | End: 2022-04-01

## 2022-03-30 RX ORDER — BALSALAZIDE DISODIUM 750 MG/1
3 CAPSULE ORAL
Qty: 0 | Refills: 0 | DISCHARGE

## 2022-03-30 RX ORDER — FINASTERIDE 5 MG/1
5 TABLET, FILM COATED ORAL DAILY
Refills: 0 | Status: DISCONTINUED | OUTPATIENT
Start: 2022-03-30 | End: 2022-04-03

## 2022-03-30 RX ADMIN — PIPERACILLIN AND TAZOBACTAM 200 GRAM(S): 4; .5 INJECTION, POWDER, LYOPHILIZED, FOR SOLUTION INTRAVENOUS at 03:03

## 2022-03-30 RX ADMIN — HEPARIN SODIUM 5000 UNIT(S): 5000 INJECTION INTRAVENOUS; SUBCUTANEOUS at 13:30

## 2022-03-30 RX ADMIN — PIPERACILLIN AND TAZOBACTAM 25 GRAM(S): 4; .5 INJECTION, POWDER, LYOPHILIZED, FOR SOLUTION INTRAVENOUS at 21:50

## 2022-03-30 RX ADMIN — BALSALAZIDE DISODIUM 2250 MILLIGRAM(S): 750 CAPSULE ORAL at 21:46

## 2022-03-30 RX ADMIN — ONDANSETRON 4 MILLIGRAM(S): 8 TABLET, FILM COATED ORAL at 13:44

## 2022-03-30 RX ADMIN — Medication 400 MILLIGRAM(S): at 23:11

## 2022-03-30 RX ADMIN — HEPARIN SODIUM 5000 UNIT(S): 5000 INJECTION INTRAVENOUS; SUBCUTANEOUS at 21:46

## 2022-03-30 RX ADMIN — Medication 1 TABLET(S): at 13:28

## 2022-03-30 RX ADMIN — FINASTERIDE 5 MILLIGRAM(S): 5 TABLET, FILM COATED ORAL at 13:29

## 2022-03-30 RX ADMIN — ATORVASTATIN CALCIUM 40 MILLIGRAM(S): 80 TABLET, FILM COATED ORAL at 21:46

## 2022-03-30 RX ADMIN — SODIUM CHLORIDE 333.33 MILLILITER(S): 9 INJECTION INTRAMUSCULAR; INTRAVENOUS; SUBCUTANEOUS at 08:37

## 2022-03-30 RX ADMIN — PIPERACILLIN AND TAZOBACTAM 200 GRAM(S): 4; .5 INJECTION, POWDER, LYOPHILIZED, FOR SOLUTION INTRAVENOUS at 17:56

## 2022-03-30 RX ADMIN — TAMSULOSIN HYDROCHLORIDE 0.4 MILLIGRAM(S): 0.4 CAPSULE ORAL at 21:46

## 2022-03-30 RX ADMIN — PIPERACILLIN AND TAZOBACTAM 25 GRAM(S): 4; .5 INJECTION, POWDER, LYOPHILIZED, FOR SOLUTION INTRAVENOUS at 14:00

## 2022-03-30 RX ADMIN — Medication 500 MILLIGRAM(S): at 13:33

## 2022-03-30 RX ADMIN — ONDANSETRON 4 MILLIGRAM(S): 8 TABLET, FILM COATED ORAL at 04:55

## 2022-03-30 RX ADMIN — SODIUM CHLORIDE 100 MILLILITER(S): 9 INJECTION, SOLUTION INTRAVENOUS at 12:45

## 2022-03-30 RX ADMIN — BALSALAZIDE DISODIUM 2250 MILLIGRAM(S): 750 CAPSULE ORAL at 13:29

## 2022-03-30 RX ADMIN — Medication 650 MILLIGRAM(S): at 01:30

## 2022-03-30 RX ADMIN — PIPERACILLIN AND TAZOBACTAM 25 GRAM(S): 4; .5 INJECTION, POWDER, LYOPHILIZED, FOR SOLUTION INTRAVENOUS at 05:43

## 2022-03-30 RX ADMIN — Medication 650 MILLIGRAM(S): at 00:00

## 2022-03-30 RX ADMIN — Medication 650 MILLIGRAM(S): at 10:00

## 2022-03-30 RX ADMIN — Medication 650 MILLIGRAM(S): at 09:29

## 2022-03-30 NOTE — CONSULT NOTE ADULT - SUBJECTIVE AND OBJECTIVE BOX
HPI:   Patient is a 62y male with PMH significant for HLD, UC which was dx 9 yrs ago, on balsalazide, BPH    He was hospitalized b/w  - 22 with fevers & leukocytosis - sepsis from bilateral pyelonephritis. CT imaging had revealed enlarged prostate with indented urinary bladder base by nodular enlarged prostate gland, right hydroureter and right-sided urothelial enhancement with asymmetric right greater than left perinephric fat stranding. Findings concerning for ascending infection/ureteritis/nephritis. He was given few days of IV Ceftriaxone. Flomax was added to Proscar. Urine cx did not recover a pathogen & he was sent home on Cefuroxime. He reports that he recovered fully. He is no longer micturating 4-5 times/ night, rather only once a night & has a good stream of urination.     Yet as of 3/27/22, he developed fevers, vomited, lost his appetite. Developed diarrhea. Called his urologist, who told him, that it was likely not  tract - given no  symptoms now. Went to see his PCP on 3/29/22 & was found febrile to 102.7F - referred to the ER    Here found febrile & tachycardic, WBC 16.92 with 86% polys. In DENNY with creatinine increased from 1.02 to 1.83. UA with only 6 WBCs. CT repeated without contrast & revealed no hydronephrosis, slightly decreased right hydroureter since 2022. Nonspecific perinephric stranding bilaterally, right greater than left, similar to the prior study. Infection such as pyelonephritis cannot be assessed without intravenous contrast. Enlarged prostate gland.    REVIEW OF SYSTEMS:  All other review of systems negative (Comprehensive ROS) except as above     PAST MEDICAL & SURGICAL HISTORY:  Dyslipidemia (high LDL; low HDL)  Colitis - no flare up x 10 years  Seasonal allergies  Complex tear of medial meniscus of left knee  H/O arthroscopy of right knee  History of strabismus surgery  History of appendectomy    Allergies  No Known Allergies      Antimicrobials Day #  :  piperacillin/tazobactam IVPB.. 3.375 Gram(s) IV Intermittent every 8 hours    Other Medications:  acetaminophen     Tablet .. 650 milliGRAM(s) Oral every 6 hours PRN  ascorbic acid 500 milliGRAM(s) Oral daily  atorvastatin 40 milliGRAM(s) Oral at bedtime  balsalazide 2250 milliGRAM(s) Oral three times a day  finasteride 5 milliGRAM(s) Oral daily  heparin   Injectable 5000 Unit(s) SubCutaneous every 8 hours  lactated ringers. 1000 milliLiter(s) IV Continuous <Continuous>  multivitamin 1 Tablet(s) Oral daily  ondansetron Injectable 4 milliGRAM(s) IV Push every 8 hours PRN  tamsulosin 0.4 milliGRAM(s) Oral at bedtime      FAMILY HISTORY:  Family history of lung cancer (Sibling), who was a smoker      SOCIAL HISTORY:  Smoking: Former smoker    ETOH: No     Drug Use: NO        T(F): 98.5 (22 @ 11:52), Max: 101.1 (22 @ 09:28)  HR: 89 (22 @ 09:28)  BP: 105/66 (22 @ 09:28)  RR: 18 (22 @ 09:28)  SpO2: 96% (22 @ 09:28)  Wt(kg): --    PHYSICAL EXAM:  General: alert, no acute distress  Eyes:  anicteric, no conjunctival injection, no discharge  Oropharynx: no lesions or injection 	  Neck: supple  Lungs: clear to auscultation  Heart: regular rate and rhythm; no murmurs  Abdomen: soft, nondistended, nontender, without mass or organomegaly. Bowel sounds +.   Skin: no lesions  Extremities: no clubbing, cyanosis, or edema  Neurologic: alert, oriented, moves all extremities    LAB RESULTS:                        13.7   16.02 )-----------( 155      ( 30 Mar 2022 05:39 )             41.8         133<L>  |  99  |  26<H>  ----------------------------<  120<H>  3.9   |  18<L>  |  1.53<H>    Ca    9.3      30 Mar 2022 05:39  Phos  2.8       Mg     1.8         TPro  7.5  /  Alb  3.9  /  TBili  0.6  /  DBili  x   /  AST  26  /  ALT  12  /  AlkPhos  34<L>      LIVER FUNCTIONS - ( 30 Mar 2022 05:39 )  Alb: 3.9 g/dL / Pro: 7.5 g/dL / ALK PHOS: 34 U/L / ALT: 12 U/L / AST: 26 U/L / GGT: x           Urinalysis Basic - ( 29 Mar 2022 19:02 )    Color: Yellow / Appearance: Slightly Turbid / S.024 / pH: x  Gluc: x / Ketone: Negative  / Bili: Negative / Urobili: Negative   Blood: x / Protein: 30 mg/dL / Nitrite: Negative   Leuk Esterase: Negative / RBC: 3 /hpf / WBC 6 /HPF   Sq Epi: x / Non Sq Epi: 3 /hpf / Bacteria: Negative        MICROBIOLOGY:  RECENT CULTURES:        RADIOLOGY REVIEWED:

## 2022-03-30 NOTE — PROGRESS NOTE ADULT - ASSESSMENT
61yo M w/ PMHx of ulcerative colitis and HLD presents with fever 63yo M w/ P-obtain urine/blood cultures       Plan: repeat CT scan abdomen/pelvis with IV contrast once DENNY resolved. Creatinine 1.53.     SIRS criteria on arrival (WBC, tachycardia)    obtain HIV test (patient gave verbal consent)    Agree to start empirically Zosyn, UA appears normal but CT suggests pylo with fever.     CXR is clear, creatinine 1.53, add /h, follow in AM.       61yo Male admitted for fevers.        Plan: repeat CT scan abdomen/pelvis with IV contrast once DENNY resolved. Creatinine 1.53 today.      SIRS criteria on arrival (WBC, tachycardia)    obtain HIV test (patient gave verbal consent)    Agree to start empirically Zosyn, UA appears normal but CT suggests pylo with fever. Follow blood and urine cultures.      CXR is clear, creatinine 1.53, add /h, follow in AM.      Continue Flomax and Proscar for BPH.  Check PVR tomorrow at bedside.      61yo Male admitted for fevers.        Plan: repeat CT scan abdomen/pelvis with IV contrast once DENNY resolved. Creatinine 1.53 today.      SIRS criteria on arrival (WBC, tachycardia)    obtain HIV test (patient gave verbal consent)    Agree to start empirically Zosyn, UA appears normal but CT suggests pylo with fever. Follow blood and urine cultures.      CXR is clear, creatinine 1.53, add /h, follow in AM.  Flu panel and COVID all negative.     Continue Flomax and Proscar for BPH.  Check PVR tomorrow at bedside.

## 2022-03-30 NOTE — PROGRESS NOTE ADULT - SUBJECTIVE AND OBJECTIVE BOX
INTERVAL HPI/OVERNIGHT EVENTS:  Pt seen and examined at bedside.     Allergies/Intolerance: No Known Allergies      MEDICATIONS  (STANDING):  ascorbic acid 500 milliGRAM(s) Oral daily  atorvastatin 40 milliGRAM(s) Oral at bedtime  balsalazide 2250 milliGRAM(s) Oral three times a day  finasteride 5 milliGRAM(s) Oral daily  heparin   Injectable 5000 Unit(s) SubCutaneous every 8 hours  multivitamin 1 Tablet(s) Oral daily  piperacillin/tazobactam IVPB.. 3.375 Gram(s) IV Intermittent every 8 hours  tamsulosin 0.4 milliGRAM(s) Oral at bedtime    MEDICATIONS  (PRN):  acetaminophen     Tablet .. 650 milliGRAM(s) Oral every 6 hours PRN Temp greater or equal to 38C (100.4F), Mild Pain (1 - 3)  ondansetron Injectable 4 milliGRAM(s) IV Push every 8 hours PRN Nausea and/or Vomiting        ROS: all systems reviewed and wnl      PHYSICAL EXAMINATION:  Vital Signs Last 24 Hrs  T(C): 38.4 (30 Mar 2022 09:28), Max: 38.4 (30 Mar 2022 09:28)  T(F): 101.1 (30 Mar 2022 09:28), Max: 101.1 (30 Mar 2022 09:28)  HR: 89 (30 Mar 2022 09:28) (85 - 109)  BP: 105/66 (30 Mar 2022 09:28) (105/62 - 131/80)  BP(mean): 98 (29 Mar 2022 19:15) (98 - 98)  RR: 18 (30 Mar 2022 09:28) (17 - 18)  SpO2: 96% (30 Mar 2022 09:28) (95% - 99%)  CAPILLARY BLOOD GLUCOSE            GENERAL:   NECK: supple, No JVD  CHEST/LUNG: clear to auscultation bilaterally; no rales, rhonchi, or wheezing b/l  HEART: normal S1, S2  ABDOMEN: BS+, soft, ND, NT   EXTREMITIES:  pulses palpable; no clubbing, cyanosis, or edema b/l LEs  SKIN: no rashes or lesions      LABS:                        13.7   16.02 )-----------( 155      ( 30 Mar 2022 05:39 )             41.8     03-30    133<L>  |  99  |  26<H>  ----------------------------<  120<H>  3.9   |  18<L>  |  1.53<H>    Ca    9.3      30 Mar 2022 05:39  Phos  2.8       Mg     1.8         TPro  7.5  /  Alb  3.9  /  TBili  0.6  /  DBili  x   /  AST  26  /  ALT  12  /  AlkPhos  34<L>        Urinalysis Basic - ( 29 Mar 2022 19:02 )    Color: Yellow / Appearance: Slightly Turbid / S.024 / pH: x  Gluc: x / Ketone: Negative  / Bili: Negative / Urobili: Negative   Blood: x / Protein: 30 mg/dL / Nitrite: Negative   Leuk Esterase: Negative / RBC: 3 /hpf / WBC 6 /HPF   Sq Epi: x / Non Sq Epi: 3 /hpf / Bacteria: Negative             INTERVAL HPI/OVERNIGHT EVENTS:  Pt seen and examined at bedside.     Allergies/Intolerance: No Known Allergies      MEDICATIONS  (STANDING):  ascorbic acid 500 milliGRAM(s) Oral daily  atorvastatin 40 milliGRAM(s) Oral at bedtime  balsalazide 2250 milliGRAM(s) Oral three times a day  finasteride 5 milliGRAM(s) Oral daily  heparin   Injectable 5000 Unit(s) SubCutaneous every 8 hours  multivitamin 1 Tablet(s) Oral daily  piperacillin/tazobactam IVPB.. 3.375 Gram(s) IV Intermittent every 8 hours  tamsulosin 0.4 milliGRAM(s) Oral at bedtime    MEDICATIONS  (PRN):  acetaminophen     Tablet .. 650 milliGRAM(s) Oral every 6 hours PRN Temp greater or equal to 38C (100.4F), Mild Pain (1 - 3)  ondansetron Injectable 4 milliGRAM(s) IV Push every 8 hours PRN Nausea and/or Vomiting        ROS: all systems reviewed and wnl      PHYSICAL EXAMINATION:  Vital Signs Last 24 Hrs  T(C): 38.4 (30 Mar 2022 09:28), Max: 38.4 (30 Mar 2022 09:28)  T(F): 101.1 (30 Mar 2022 09:28), Max: 101.1 (30 Mar 2022 09:28)  HR: 89 (30 Mar 2022 09:28) (85 - 109)  BP: 105/66 (30 Mar 2022 09:28) (105/62 - 131/80)  BP(mean): 98 (29 Mar 2022 19:15) (98 - 98)  RR: 18 (30 Mar 2022 09:28) (17 - 18)  SpO2: 96% (30 Mar 2022 09:28) (95% - 99%)  CAPILLARY BLOOD GLUCOSE            GENERAL: stable, seen in ER, still has fever, no flank pain.   NECK: supple, No JVD  CHEST/LUNG: clear to auscultation bilaterally; no rales, rhonchi, or wheezing b/l  HEART: normal S1, S2  ABDOMEN: BS+, soft, ND, NT   EXTREMITIES:  pulses palpable; no clubbing, cyanosis, or edema b/l LEs  SKIN: no rashes or lesions      LABS:                        13.7   16.02 )-----------( 155      ( 30 Mar 2022 05:39 )             41.8     03-30    133<L>  |  99  |  26<H>  ----------------------------<  120<H>  3.9   |  18<L>  |  1.53<H>    Ca    9.3      30 Mar 2022 05:39  Phos  2.8       Mg     1.8         TPro  7.5  /  Alb  3.9  /  TBili  0.6  /  DBili  x   /  AST  26  /  ALT  12  /  AlkPhos  34<L>        Urinalysis Basic - ( 29 Mar 2022 19:02 )    Color: Yellow / Appearance: Slightly Turbid / S.024 / pH: x  Gluc: x / Ketone: Negative  / Bili: Negative / Urobili: Negative   Blood: x / Protein: 30 mg/dL / Nitrite: Negative   Leuk Esterase: Negative / RBC: 3 /hpf / WBC 6 /HPF   Sq Epi: x / Non Sq Epi: 3 /hpf / Bacteria: Negative

## 2022-03-30 NOTE — PROVIDER CONTACT NOTE (OTHER) - ASSESSMENT
Pt AOx4, denies any chest pain, distress, palpitations. Pt vomited x1, non-bloody. Pt tachy upon returning to bed- 120s, now . Pt shaking, afebrile, states he still feels nauseous. Other VSS

## 2022-03-30 NOTE — CHART NOTE - NSCHARTNOTEFT_GEN_A_CORE
Called by RN to evaluate pt with fever of 102.2. Pt seen and evaluated. Pt reports of diarrhea. Denies headache, dizziness, chest pain, palpitations, abd pain, nausea, vomiting or LE edema. Denies any urinary symptoms.       Vital Signs Last 24 Hrs  T(C): 39 (30 Mar 2022 22:48), Max: 39 (30 Mar 2022 22:48)  T(F): 102.2 (30 Mar 2022 22:48), Max: 102.2 (30 Mar 2022 22:48)  HR: 95 (30 Mar 2022 22:48) (89 - 109)  BP: 121/51 (30 Mar 2022 22:48) (105/66 - 131/80)  BP(mean): --  RR: 18 (30 Mar 2022 22:48) (18 - 18)  SpO2: 96% (30 Mar 2022 22:48) (95% - 99%)      Labs:                          13.7   16.02 )-----------( 155      ( 30 Mar 2022 05:39 )             41.8     03-30    133<L>  |  99  |  26<H>  ----------------------------<  120<H>  3.9   |  18<L>  |  1.53<H>    Ca    9.3      30 Mar 2022 05:39  Phos  2.8     03-30  Mg     1.8     03-30    TPro  7.5  /  Alb  3.9  /  TBili  0.6  /  DBili  x   /  AST  26  /  ALT  12  /  AlkPhos  34<L>  03-30        Radiology:    < from: Xray Chest 1 View AP/PA (03.29.22 @ 17:15) >    Clear lungs.    < end of copied text >    < from: CT Abdomen and Pelvis No Cont (03.30.22 @ 00:58) >    nce 2/21/2022.    Nonspecific perinephric stranding bilaterally, right greater than left,   similar to the prior study. Infection such as pyelonephritis cannot be   assessed without intravenous contrast.    Enlarged prostate gland.      < end of copied text >        Physical Exam:  General: WN/WD NAD  Neurology: A&Ox3, nonfocal, ACOSTA x 4  Head:  Normocephalic, atraumatic  Respiratory: CTA B/L  CV: RRR, S1S2  Abdominal: Soft, NT, ND no palpable mass  MSK: No edema, + peripheral pulses, FROM all 4 extremity    Assessment & Plan:  HPI:  61yo M w/ PMHx of ulcerative colitis and HLD presents with fever, patient was recently admitted 2/2022 to [facility] for similar complaints, was treated for UTI due to imaging findings concerning for acute infection although urine culture was unremarkable, since then the patient reports have scope performed with urology as outpatient, was told he needed a procedure performed on his prostate but was unable to clarify which, he completed course of cefuroxime on prior discharge, he was started on tamsulosin which he has been taking, he presents today for fever, chills and body aches which began 3 days ago, he took his temperature at home which was as high as 102.7. Now being evaluated for recurrent fever    Recurrent fever  - unknown source thus far  - Given DENNY and perinephritic stranding on CT, r/o prostatitis. US renal/ prostate pending.   - Antipyretic prn, cooling measures  - continue Zosyn   - F/U Blood culture and urine culture  - Continue IVF LR @ 100cc/hr  - Will continue to monitor pt   F/U with primary team and ID in AM.     Viviana Chapa NP  [id number]

## 2022-03-30 NOTE — CONSULT NOTE ADULT - ASSESSMENT
62y male with PMH significant for HLD, UC on balsalazide & BPH    He was hospitalized b/w 2/21 - 2/23/22 with sepsis from bilateral pyelonephritis in the setting of an enlarged prostate. Treated with 3 days of empiric IV Ceftriaxone & he was sent home on empiric Cefuroxime with negative cx. He recovered fully.   As of 3/27/22, he developed fevers, vomited, lost his appetite, developed some diarrhea.   On 3/29/22 found febrile to 102.7F at PCP office & had reported  symptoms   Here febrile, tachycardic, WBC 16.92 with 86% polys and in DENNY  Has had multiple small loose BMs since presented & vomited x 1   CT repeated without hydronephrosis, slightly decreased right hydroureter but still with findings of b/l pyelonephritis     He can have DENNY, leukocytosis & fevers from C diff diarrhea, or alternatively, he has prostatitis serving as a nidus for recurrent/ or partially treated pyelonephritis & diarrhea is from inflamed bowels in the setting of acute infection.  Urine cx can be negative in case of prostatitis.    Recommend:  Check stools for c diff  Renal / bladder sonogram   Start Ceftriaxone, with an aim of longer duration of treatment - 2 - 3 wks if C diff neg  Follow fevers, cultures & BM frequency  Thank you will follow     62y male with PMH significant for HLD, UC on balsalazide & BPH    He was hospitalized b/w 2/21 - 2/23/22 with sepsis from bilateral pyelonephritis in the setting of an enlarged prostate. Treated with 3 days of empiric IV Ceftriaxone & he was sent home on empiric Cefuroxime with negative cx. He recovered fully.   As of 3/27/22, he developed fevers, vomited, lost his appetite, developed some diarrhea.   On 3/29/22 found febrile to 102.7F at PCP office & had reported  symptoms   Here febrile, tachycardic, WBC 16.92 with 86% polys and in DENNY  Has had multiple small loose BMs since presented & vomited x 1   CT repeated without hydronephrosis, slightly decreased right hydroureter but still with findings of b/l pyelonephritis     He can have DENNY, leukocytosis & fevers from C diff diarrhea, or alternatively, he has prostatitis serving as a nidus for recurrent/ or partially treated pyelonephritis & diarrhea is from inflamed bowels in the setting of acute infection.  Urine cx can be negative in case of prostatitis.    Recommend:  Check stools for c diff  Renal / bladder / prostate sonogram   Continue Zosyn   Hydration & monitor creatinine   Follow fevers, cultures & BM frequency  Thank you will follow

## 2022-03-31 LAB
ANION GAP SERPL CALC-SCNC: 17 MMOL/L — SIGNIFICANT CHANGE UP (ref 5–17)
BILIRUB SERPL-MCNC: 0.3 MG/DL — SIGNIFICANT CHANGE UP (ref 0.2–1.2)
BUN SERPL-MCNC: 20 MG/DL — SIGNIFICANT CHANGE UP (ref 7–23)
CALCIUM SERPL-MCNC: 8.2 MG/DL — LOW (ref 8.4–10.5)
CHLORIDE SERPL-SCNC: 102 MMOL/L — SIGNIFICANT CHANGE UP (ref 96–108)
CO2 SERPL-SCNC: 15 MMOL/L — LOW (ref 22–31)
CREAT SERPL-MCNC: 1.02 MG/DL — SIGNIFICANT CHANGE UP (ref 0.5–1.3)
EGFR: 84 ML/MIN/1.73M2 — SIGNIFICANT CHANGE UP
GLUCOSE SERPL-MCNC: 72 MG/DL — SIGNIFICANT CHANGE UP (ref 70–99)
HCT VFR BLD CALC: 32.7 % — LOW (ref 39–50)
HGB BLD-MCNC: 11 G/DL — LOW (ref 13–17)
INR BLD: 1.28 RATIO — HIGH (ref 0.88–1.16)
MCHC RBC-ENTMCNC: 30.6 PG — SIGNIFICANT CHANGE UP (ref 27–34)
MCHC RBC-ENTMCNC: 33.6 GM/DL — SIGNIFICANT CHANGE UP (ref 32–36)
MCV RBC AUTO: 91.1 FL — SIGNIFICANT CHANGE UP (ref 80–100)
MELD SCORE WITH DIALYSIS: 24 POINTS — SIGNIFICANT CHANGE UP
MELD SCORE WITHOUT DIALYSIS: 9 POINTS — SIGNIFICANT CHANGE UP
NRBC # BLD: 0 /100 WBCS — SIGNIFICANT CHANGE UP (ref 0–0)
PLATELET # BLD AUTO: 134 K/UL — LOW (ref 150–400)
POTASSIUM SERPL-MCNC: 3.9 MMOL/L — SIGNIFICANT CHANGE UP (ref 3.5–5.3)
POTASSIUM SERPL-SCNC: 3.9 MMOL/L — SIGNIFICANT CHANGE UP (ref 3.5–5.3)
PROTHROM AB SERPL-ACNC: 14.7 SEC — HIGH (ref 10.5–13.4)
RBC # BLD: 3.59 M/UL — LOW (ref 4.2–5.8)
RBC # FLD: 15.1 % — HIGH (ref 10.3–14.5)
SODIUM SERPL-SCNC: 134 MMOL/L — LOW (ref 135–145)
WBC # BLD: 11.41 K/UL — HIGH (ref 3.8–10.5)
WBC # FLD AUTO: 11.41 K/UL — HIGH (ref 3.8–10.5)

## 2022-03-31 RX ORDER — INFLUENZA VIRUS VACCINE 15; 15; 15; 15 UG/.5ML; UG/.5ML; UG/.5ML; UG/.5ML
0.5 SUSPENSION INTRAMUSCULAR ONCE
Refills: 0 | Status: DISCONTINUED | OUTPATIENT
Start: 2022-03-31 | End: 2022-04-03

## 2022-03-31 RX ADMIN — SODIUM CHLORIDE 100 MILLILITER(S): 9 INJECTION, SOLUTION INTRAVENOUS at 17:21

## 2022-03-31 RX ADMIN — FINASTERIDE 5 MILLIGRAM(S): 5 TABLET, FILM COATED ORAL at 13:03

## 2022-03-31 RX ADMIN — HEPARIN SODIUM 5000 UNIT(S): 5000 INJECTION INTRAVENOUS; SUBCUTANEOUS at 13:03

## 2022-03-31 RX ADMIN — TAMSULOSIN HYDROCHLORIDE 0.4 MILLIGRAM(S): 0.4 CAPSULE ORAL at 21:29

## 2022-03-31 RX ADMIN — BALSALAZIDE DISODIUM 2250 MILLIGRAM(S): 750 CAPSULE ORAL at 21:28

## 2022-03-31 RX ADMIN — HEPARIN SODIUM 5000 UNIT(S): 5000 INJECTION INTRAVENOUS; SUBCUTANEOUS at 05:22

## 2022-03-31 RX ADMIN — ATORVASTATIN CALCIUM 40 MILLIGRAM(S): 80 TABLET, FILM COATED ORAL at 21:29

## 2022-03-31 RX ADMIN — PIPERACILLIN AND TAZOBACTAM 25 GRAM(S): 4; .5 INJECTION, POWDER, LYOPHILIZED, FOR SOLUTION INTRAVENOUS at 05:23

## 2022-03-31 RX ADMIN — HEPARIN SODIUM 5000 UNIT(S): 5000 INJECTION INTRAVENOUS; SUBCUTANEOUS at 21:27

## 2022-03-31 RX ADMIN — PIPERACILLIN AND TAZOBACTAM 25 GRAM(S): 4; .5 INJECTION, POWDER, LYOPHILIZED, FOR SOLUTION INTRAVENOUS at 21:27

## 2022-03-31 RX ADMIN — BALSALAZIDE DISODIUM 2250 MILLIGRAM(S): 750 CAPSULE ORAL at 05:22

## 2022-03-31 RX ADMIN — Medication 500 MILLIGRAM(S): at 13:03

## 2022-03-31 RX ADMIN — Medication 1000 MILLIGRAM(S): at 00:22

## 2022-03-31 RX ADMIN — BALSALAZIDE DISODIUM 2250 MILLIGRAM(S): 750 CAPSULE ORAL at 13:03

## 2022-03-31 RX ADMIN — PIPERACILLIN AND TAZOBACTAM 25 GRAM(S): 4; .5 INJECTION, POWDER, LYOPHILIZED, FOR SOLUTION INTRAVENOUS at 13:03

## 2022-03-31 NOTE — PATIENT PROFILE ADULT - NSPROEXTENSIONSOFSELF_GEN_A_NUR
none You can access the FollowMyHealth Patient Portal offered by [facility] by registering at the following website: [email or web address]. By joining [facility]’s FollowMyHealth portal, you will also be able to view your health information using other applications (apps) compatible with our system.

## 2022-03-31 NOTE — PROGRESS NOTE ADULT - SUBJECTIVE AND OBJECTIVE BOX
[facility]    REVIEW OF SYSTEMS:  GEN: no fever,    no chills  RESP: no SOB,   no cough  CVS: no chest pain,   no palpitations  GI: no abdominal pain,   no nausea,   no vomiting,   no constipation,   no diarrhea  : no dysuria,   no frequency  NEURO: no headache,   no dizziness  PSYCH: no depression,   not anxious  Derm : no rash    MEDICATIONS  (STANDING):  ascorbic acid 500 milliGRAM(s) Oral daily  atorvastatin 40 milliGRAM(s) Oral at bedtime  balsalazide 2250 milliGRAM(s) Oral three times a day  finasteride 5 milliGRAM(s) Oral daily  heparin   Injectable 5000 Unit(s) SubCutaneous every 8 hours  influenza   Vaccine 0.5 milliLiter(s) IntraMuscular once  lactated ringers. 1000 milliLiter(s) (100 mL/Hr) IV Continuous <Continuous>  multivitamin 1 Tablet(s) Oral daily  piperacillin/tazobactam IVPB.. 3.375 Gram(s) IV Intermittent every 8 hours  tamsulosin 0.4 milliGRAM(s) Oral at bedtime    MEDICATIONS  (PRN):  acetaminophen     Tablet .. 650 milliGRAM(s) Oral every 6 hours PRN Temp greater or equal to 38C (100.4F), Mild Pain (1 - 3)  ondansetron Injectable 4 milliGRAM(s) IV Push every 8 hours PRN Nausea and/or Vomiting      Vital Signs Last 24 Hrs  T(C): 37.2 (31 Mar 2022 04:50), Max: 39 (30 Mar 2022 22:48)  T(F): 98.9 (31 Mar 2022 04:50), Max: 102.2 (30 Mar 2022 22:48)  HR: 82 (31 Mar 2022 04:50) (77 - 95)  BP: 110/68 (31 Mar 2022 04:50) (94/60 - 121/51)  BP(mean): --  RR: 18 (31 Mar 2022 04:50) (18 - 18)  SpO2: 100% (31 Mar 2022 04:50) (96% - 100%)  CAPILLARY BLOOD GLUCOSE        I&O's Summary      PHYSICAL EXAM:  HEAD:  Atraumatic, Normocephalic  NECK: Supple, No   JVD  CHEST/LUNG:   no     rales,     no,    rhonchi  HEART: Regular rate and rhythm;         murmur  ABDOMEN: Soft, Nontender, ;   EXTREMITIES:    no    edema  NEUROLOGY:  alert    LABS:                        11.0   11.41 )-----------( 134      ( 31 Mar 2022 06:51 )             32.7         134<L>  |  102  |  20  ----------------------------<  72  3.9   |  15<L>  |  1.02    Ca    8.2<L>      31 Mar 2022 06:50  Phos  2.8       Mg     1.8         TPro  x   /  Alb  x   /  TBili  0.3  /  DBili  x   /  AST  x   /  ALT  x   /  AlkPhos  x       PT/INR - ( 31 Mar 2022 06:50 )   PT: 14.7 sec;   INR: 1.28 ratio               Urinalysis Basic - ( 29 Mar 2022 19:02 )    Color: Yellow / Appearance: Slightly Turbid / S.024 / pH: x  Gluc: x / Ketone: Negative  / Bili: Negative / Urobili: Negative   Blood: x / Protein: 30 mg/dL / Nitrite: Negative   Leuk Esterase: Negative / RBC: 3 /hpf / WBC 6 /HPF   Sq Epi: x / Non Sq Epi: 3 /hpf / Bacteria: Negative           @ 17:08  3.7  18              Consultant(s) Notes Reviewed:      Care Discussed with Consultants/Other Providers:

## 2022-03-31 NOTE — PROGRESS NOTE ADULT - ASSESSMENT
62y male     with PMH significant for HLD, UC on balsalazide & BPH    He was hospitalized b/w 2/21 - 2/23/22 with sepsis from bilateral pyelonephritis  On 3/29/22 found febrile to 102.7F at PCP office & had reported  symptoms      was  febrile, tachycardic, WBC 16.92 with 86% polys and in DENNY  Has had multiple small loose BMs since presented & vomited x 1   CT . no hydronephrosis,   decreased right hydroureter, with b/l pyelonephritis   seen by ID   on  iv zosyn  on lipitor/  flomax   on  dvt ppx   folow cx   62y male     with PMH significant for HLD, UC on balsalazide & BPH    He was hospitalized b/w 2/21 - 2/23/22 with s  pyelonephritis  On 3/29/22 found febrile to 102.7F at PCP office      was  febrile, tachycardic, WBC 16.92 with 86% polys and in DENNY  Has had multiple small loose BMs since presented & vomited x 1   CT . no hydronephrosis,   decreased right hydroureter, with b/l pyelonephritis   seen by ID   on  iv zosyn/  sirs  from pyelo  on lipitor/  flomax   on  dvt ppx   follow  c/s    62y male     with PMH significant for HLD, UC on balsalazide & BPH    He was hospitalized b/w 2/21 - 2/23/22 with s  pyelonephritis  On 3/29/22 found febrile to 102.7F at PCP office      was  febrile, tachycardic, WBC 16.92 with 86% polys and in DENNY  Has had multiple small loose BMs since presented & vomited x 1   CT . no hydronephrosis,   decreased right hydroureter, with b/l pyelonephritis   seen by ID   on  iv zosyn/  sirs  from pyelo   was  seen  by  his  urologist  dr bee guerra  very  recently  and  cystoscopy,  was  unrevealing/ another  uro  eval  would  be  superfluous  on lipitor/  flomax   on  dvt ppx   follow  c/s

## 2022-03-31 NOTE — PATIENT PROFILE ADULT - FALL HARM RISK - HARM RISK INTERVENTIONS

## 2022-03-31 NOTE — PROGRESS NOTE ADULT - SUBJECTIVE AND OBJECTIVE BOX
CC: f/u for fever and chills    Patient reports: some diarrhea, no  complaints.    REVIEW OF SYSTEMS:  All other review of systems negative (Comprehensive ROS)    Antimicrobials Day #  :day 2  piperacillin/tazobactam IVPB.. 3.375 Gram(s) IV Intermittent every 8 hours    Other Medications Reviewed  MEDICATIONS  (STANDING):  ascorbic acid 500 milliGRAM(s) Oral daily  atorvastatin 40 milliGRAM(s) Oral at bedtime  balsalazide 2250 milliGRAM(s) Oral three times a day  finasteride 5 milliGRAM(s) Oral daily  heparin   Injectable 5000 Unit(s) SubCutaneous every 8 hours  influenza   Vaccine 0.5 milliLiter(s) IntraMuscular once  lactated ringers. 1000 milliLiter(s) (100 mL/Hr) IV Continuous <Continuous>  piperacillin/tazobactam IVPB.. 3.375 Gram(s) IV Intermittent every 8 hours  tamsulosin 0.4 milliGRAM(s) Oral at bedtime    T(F): 99.1 (22 @ 12:05), Max: 102.2 (22 @ 22:48)  HR: 76 (22 @ 12:05)  BP: 103/60 (22 @ 12:05)  RR: 18 (22 @ 12:05)  SpO2: 98% (22 @ 12:05)  Wt(kg): --    PHYSICAL EXAM:  General: alert, no acute distress  Eyes:  anicteric, no conjunctival injection, no discharge  Oropharynx: no lesions or injection 	  Neck: supple, without adenopathy  Lungs: clear to auscultation  Heart: regular rate and rhythm; no murmur, rubs or gallops  Abdomen: soft, nondistended, nontender, without mass or organomegaly  Skin: no lesions  Extremities: no clubbing, cyanosis, or edema  Neurologic: alert, oriented, moves all extremities    LAB RESULTS:                        11.0   11.41 )-----------( 134      ( 31 Mar 2022 06:51 )             32.7         134<L>  |  102  |  20  ----------------------------<  72  3.9   |  15<L>  |  1.02    Ca    8.2<L>      31 Mar 2022 06:50  Phos  2.8       Mg     1.8         TPro  x   /  Alb  x   /  TBili  0.3  /  DBili  x   /  AST  x   /  ALT  x   /  AlkPhos  x       LIVER FUNCTIONS - ( 30 Mar 2022 05:39 )  Alb: 3.9 g/dL / Pro: 7.5 g/dL / ALK PHOS: 34 U/L / ALT: 12 U/L / AST: 26 U/L / GGT: x           Urinalysis Basic - ( 29 Mar 2022 19:02 )    Color: Yellow / Appearance: Slightly Turbid / S.024 / pH: x  Gluc: x / Ketone: Negative  / Bili: Negative / Urobili: Negative   Blood: x / Protein: 30 mg/dL / Nitrite: Negative   Leuk Esterase: Negative / RBC: 3 /hpf / WBC 6 /HPF   Sq Epi: x / Non Sq Epi: 3 /hpf / Bacteria: Negative      MICROBIOLOGY:  RECENT CULTURES:   @ 08:54 .Blood Blood, isolator tube     Testing in progress       @ 22:03 Clean Catch Clean Catch (Midstream)     >100,000 CFU/ml Enterococcus species       @ 18:47 .Blood Blood-Peripheral     No growth to date.          RADIOLOGY REVIEWED:  < from: CT Abdomen and Pelvis No Cont (22 @ 00:58) >  IMPRESSION:    No hydronephrosis. Mild distal right hydroureter, slightly decreased   since 2022.    Nonspecific perinephric stranding bilaterally, right greater than left,   similar to the prior study. Infection such as pyelonephritis cannot be   assessed without intravenous contrast.    Enlarged prostate gland.    < end of copied text >

## 2022-03-31 NOTE — PROGRESS NOTE ADULT - ASSESSMENT
62y male with PMH significant for HLD, UC on balsalazide & BPH    He was hospitalized b/w 2/21 - 2/23/22 with sepsis from bilateral pyelonephritis in the setting of an enlarged prostate. Treated with 3 days of empiric IV Ceftriaxone & he was sent home on empiric Cefuroxime with negative cx. He recovered fully. He also has OPD cystoscopy a few weeks ago, placed on prostate medication.  As of 3/27/22, he developed fevers, vomited, lost his appetite, developed some diarrhea.   On 3/29/22 found febrile to 102.7F at PCP office & had reported  symptoms   Here febrile, tachycardic, WBC 16.92 with 86% polys and in DENNY  Has had multiple small loose BMs since presented & vomited x 1   CT repeated without hydronephrosis, slightly decreased right hydroureter but still with findings of b/l pyelonephritis     He can have DENNY, leukocytosis & fevers from C diff diarrhea, or alternatively, he has prostatitis serving as a nidus for recurrent/ or partially treated pyelonephritis & diarrhea is from inflamed bowels in the setting of acute infection.  Urine cx can be negative in case of prostatitis.  He is now known to have enterococcus in his urine. While he does not have cystitis symptoms or flank pain, this may point towards a  focus to infection.  He is feeling a little better today. ? Early response to treatment.  C Diff toxin is negative  Recommend:  Renal / bladder / prostate sonogram  pending  Continue Zosyn . monitor for response, we may narrow to ampicillin if enterococcus is sole isolate  Hydration & monitor creatinine , it has normalized  Follow fevers, cultures & BM frequency  Favor Urology input, not clear why he is having recurrent infections, even though his course is a little atypical I still suspect a  focus.

## 2022-03-31 NOTE — CHART NOTE - NSCHARTNOTEFT_GEN_A_CORE
Patient seen at bedside this afternoon, patient with fevers overnight T.max ( 102.2). Patient stated he is a patient of  ( ) and saw him aprox 10 days prior to admission in the office. As per patient he performed a  Procedure in the office but unclear the name of the procedure; based on description sounds like a cystoscopy. Patient found to have Enterococcus growing in the Urine and currently remains on Zosyn.  called for consult as patient reported that  reported to him their was an "issue with his prostate" and it is unclear if a biopsy was performed at that time and in the setting of fevers their was concern for possible Prostatitis. Case d/w  team this morning as per team no need for inpatient consult at this time as patient without evidence of Prostatitis on CT imaging; as per  imaging is more consistent with Pyelo and recommends continued Treatment with IV abx based on Urine Cx sensitivity. Will continue Zosyn at this time and monitor sensitivity of urine cx.

## 2022-04-01 LAB
-  AMPICILLIN: SIGNIFICANT CHANGE UP
-  CIPROFLOXACIN: SIGNIFICANT CHANGE UP
-  LEVOFLOXACIN: SIGNIFICANT CHANGE UP
-  NITROFURANTOIN: SIGNIFICANT CHANGE UP
-  TETRACYCLINE: SIGNIFICANT CHANGE UP
-  VANCOMYCIN: SIGNIFICANT CHANGE UP
ANION GAP SERPL CALC-SCNC: 10 MMOL/L — SIGNIFICANT CHANGE UP (ref 5–17)
BLD GP AB SCN SERPL QL: NEGATIVE — SIGNIFICANT CHANGE UP
BUN SERPL-MCNC: 16 MG/DL — SIGNIFICANT CHANGE UP (ref 7–23)
CALCIUM SERPL-MCNC: 8.2 MG/DL — LOW (ref 8.4–10.5)
CHLORIDE SERPL-SCNC: 103 MMOL/L — SIGNIFICANT CHANGE UP (ref 96–108)
CO2 SERPL-SCNC: 20 MMOL/L — LOW (ref 22–31)
CREAT SERPL-MCNC: 1.02 MG/DL — SIGNIFICANT CHANGE UP (ref 0.5–1.3)
CULTURE RESULTS: SIGNIFICANT CHANGE UP
EGFR: 84 ML/MIN/1.73M2 — SIGNIFICANT CHANGE UP
GLUCOSE SERPL-MCNC: 106 MG/DL — HIGH (ref 70–99)
HCT VFR BLD CALC: 30.5 % — LOW (ref 39–50)
HCT VFR BLD CALC: 32.3 % — LOW (ref 39–50)
HGB BLD-MCNC: 10.4 G/DL — LOW (ref 13–17)
HGB BLD-MCNC: 10.9 G/DL — LOW (ref 13–17)
MAGNESIUM SERPL-MCNC: 1.9 MG/DL — SIGNIFICANT CHANGE UP (ref 1.6–2.6)
MCHC RBC-ENTMCNC: 30.1 PG — SIGNIFICANT CHANGE UP (ref 27–34)
MCHC RBC-ENTMCNC: 30.5 PG — SIGNIFICANT CHANGE UP (ref 27–34)
MCHC RBC-ENTMCNC: 33.7 GM/DL — SIGNIFICANT CHANGE UP (ref 32–36)
MCHC RBC-ENTMCNC: 34.1 GM/DL — SIGNIFICANT CHANGE UP (ref 32–36)
MCV RBC AUTO: 89.2 FL — SIGNIFICANT CHANGE UP (ref 80–100)
MCV RBC AUTO: 89.4 FL — SIGNIFICANT CHANGE UP (ref 80–100)
METHOD TYPE: SIGNIFICANT CHANGE UP
NRBC # BLD: 0 /100 WBCS — SIGNIFICANT CHANGE UP (ref 0–0)
NRBC # BLD: 0 /100 WBCS — SIGNIFICANT CHANGE UP (ref 0–0)
ORGANISM # SPEC MICROSCOPIC CNT: SIGNIFICANT CHANGE UP
ORGANISM # SPEC MICROSCOPIC CNT: SIGNIFICANT CHANGE UP
PHOSPHATE SERPL-MCNC: 2.2 MG/DL — LOW (ref 2.5–4.5)
PLATELET # BLD AUTO: 142 K/UL — LOW (ref 150–400)
PLATELET # BLD AUTO: 155 K/UL — SIGNIFICANT CHANGE UP (ref 150–400)
POTASSIUM SERPL-MCNC: 3.2 MMOL/L — LOW (ref 3.5–5.3)
POTASSIUM SERPL-SCNC: 3.2 MMOL/L — LOW (ref 3.5–5.3)
RBC # BLD: 3.41 M/UL — LOW (ref 4.2–5.8)
RBC # BLD: 3.62 M/UL — LOW (ref 4.2–5.8)
RBC # FLD: 15 % — HIGH (ref 10.3–14.5)
RBC # FLD: 15 % — HIGH (ref 10.3–14.5)
RH IG SCN BLD-IMP: POSITIVE — SIGNIFICANT CHANGE UP
SODIUM SERPL-SCNC: 133 MMOL/L — LOW (ref 135–145)
SPECIMEN SOURCE: SIGNIFICANT CHANGE UP
WBC # BLD: 8.67 K/UL — SIGNIFICANT CHANGE UP (ref 3.8–10.5)
WBC # BLD: 9.72 K/UL — SIGNIFICANT CHANGE UP (ref 3.8–10.5)
WBC # FLD AUTO: 8.67 K/UL — SIGNIFICANT CHANGE UP (ref 3.8–10.5)
WBC # FLD AUTO: 9.72 K/UL — SIGNIFICANT CHANGE UP (ref 3.8–10.5)

## 2022-04-01 PROCEDURE — 76770 US EXAM ABDO BACK WALL COMP: CPT | Mod: 26

## 2022-04-01 RX ORDER — SODIUM,POTASSIUM PHOSPHATES 278-250MG
1 POWDER IN PACKET (EA) ORAL
Refills: 0 | Status: COMPLETED | OUTPATIENT
Start: 2022-04-01 | End: 2022-04-02

## 2022-04-01 RX ORDER — POTASSIUM CHLORIDE 20 MEQ
40 PACKET (EA) ORAL ONCE
Refills: 0 | Status: COMPLETED | OUTPATIENT
Start: 2022-04-01 | End: 2022-04-01

## 2022-04-01 RX ADMIN — FINASTERIDE 5 MILLIGRAM(S): 5 TABLET, FILM COATED ORAL at 11:56

## 2022-04-01 RX ADMIN — Medication 40 MILLIEQUIVALENT(S): at 11:55

## 2022-04-01 RX ADMIN — BALSALAZIDE DISODIUM 2250 MILLIGRAM(S): 750 CAPSULE ORAL at 05:16

## 2022-04-01 RX ADMIN — ATORVASTATIN CALCIUM 40 MILLIGRAM(S): 80 TABLET, FILM COATED ORAL at 21:39

## 2022-04-01 RX ADMIN — HEPARIN SODIUM 5000 UNIT(S): 5000 INJECTION INTRAVENOUS; SUBCUTANEOUS at 13:23

## 2022-04-01 RX ADMIN — BALSALAZIDE DISODIUM 2250 MILLIGRAM(S): 750 CAPSULE ORAL at 13:24

## 2022-04-01 RX ADMIN — Medication 1 PACKET(S): at 17:39

## 2022-04-01 RX ADMIN — Medication 500 MILLIGRAM(S): at 11:56

## 2022-04-01 RX ADMIN — PIPERACILLIN AND TAZOBACTAM 25 GRAM(S): 4; .5 INJECTION, POWDER, LYOPHILIZED, FOR SOLUTION INTRAVENOUS at 04:56

## 2022-04-01 RX ADMIN — PIPERACILLIN AND TAZOBACTAM 25 GRAM(S): 4; .5 INJECTION, POWDER, LYOPHILIZED, FOR SOLUTION INTRAVENOUS at 12:00

## 2022-04-01 RX ADMIN — HEPARIN SODIUM 5000 UNIT(S): 5000 INJECTION INTRAVENOUS; SUBCUTANEOUS at 21:38

## 2022-04-01 RX ADMIN — PIPERACILLIN AND TAZOBACTAM 25 GRAM(S): 4; .5 INJECTION, POWDER, LYOPHILIZED, FOR SOLUTION INTRAVENOUS at 21:38

## 2022-04-01 RX ADMIN — HEPARIN SODIUM 5000 UNIT(S): 5000 INJECTION INTRAVENOUS; SUBCUTANEOUS at 05:15

## 2022-04-01 RX ADMIN — TAMSULOSIN HYDROCHLORIDE 0.4 MILLIGRAM(S): 0.4 CAPSULE ORAL at 21:39

## 2022-04-01 RX ADMIN — BALSALAZIDE DISODIUM 2250 MILLIGRAM(S): 750 CAPSULE ORAL at 21:40

## 2022-04-01 NOTE — PROGRESS NOTE ADULT - SUBJECTIVE AND OBJECTIVE BOX
CC: f/u for febrile illness    Patient reports: he is feeling better, no complaints    REVIEW OF SYSTEMS:  All other review of systems negative (Comprehensive ROS)    Antimicrobials Day #  :day 3  piperacillin/tazobactam IVPB.. 3.375 Gram(s) IV Intermittent every 8 hours    Other Medications Reviewed  MEDICATIONS  (STANDING):  ascorbic acid 500 milliGRAM(s) Oral daily  atorvastatin 40 milliGRAM(s) Oral at bedtime  balsalazide 2250 milliGRAM(s) Oral three times a day  finasteride 5 milliGRAM(s) Oral daily  heparin   Injectable 5000 Unit(s) SubCutaneous every 8 hours  influenza   Vaccine 0.5 milliLiter(s) IntraMuscular once  piperacillin/tazobactam IVPB.. 3.375 Gram(s) IV Intermittent every 8 hours  potassium chloride    Tablet ER 40 milliEquivalent(s) Oral once  potassium phosphate / sodium phosphate Powder (PHOS-NaK) 1 Packet(s) Oral two times a day  tamsulosin 0.4 milliGRAM(s) Oral at bedtime    T(F): 98.7 (04-01-22 @ 04:50), Max: 99.2 (03-31-22 @ 21:33)  HR: 72 (04-01-22 @ 04:50)  BP: 101/62 (04-01-22 @ 04:50)  RR: 18 (04-01-22 @ 04:50)  SpO2: 100% (04-01-22 @ 04:50)  Wt(kg): --    PHYSICAL EXAM:  General: alert, no acute distress  Eyes:  anicteric, no conjunctival injection, no discharge  Oropharynx: no lesions or injection 	  Neck: supple, without adenopathy  Lungs: clear to auscultation  Heart: regular rate and rhythm; no murmur, rubs or gallops  Abdomen: soft, nondistended, nontender, without mass or organomegaly  Skin: no lesions  Extremities: no clubbing, cyanosis, or edema  Neurologic: alert, oriented, moves all extremities    LAB RESULTS:                        10.4   9.72  )-----------( 142      ( 01 Apr 2022 06:46 )             30.5     04-01    133<L>  |  103  |  16  ----------------------------<  106<H>  3.2<L>   |  20<L>  |  1.02    Ca    8.2<L>      01 Apr 2022 06:43  Phos  2.2     04-01  Mg     1.9     04-01    TPro  x   /  Alb  x   /  TBili  0.3  /  DBili  x   /  AST  x   /  ALT  x   /  AlkPhos  x   03-31        MICROBIOLOGY:  RECENT CULTURES:  03-30 @ 08:54 .Blood Blood, isolator tube     Testing in progress      03-29 @ 22:03 Clean Catch Clean Catch (Midstream)     >100,000 CFU/ml Enterococcus species      03-29 @ 18:47 .Blood Blood-Peripheral     No growth to date.          RADIOLOGY REVIEWED:  < from: CT Abdomen and Pelvis No Cont (03.30.22 @ 00:58) >  IMPRESSION:    No hydronephrosis. Mild distal right hydroureter, slightly decreased   since 2/21/2022.    Nonspecific perinephric stranding bilaterally, right greater than left,   similar to the prior study. Infection such as pyelonephritis cannot be   assessed without intravenous contrast.    Enlarged prostate gland.    < end of copied text >  < from: CT Abdomen and Pelvis w/ IV Cont (02.21.22 @ 17:03) >  IMPRESSION: No renal calculus or ureteral calculus.    Minimal asymmetry in the enhancement of the renal parenchyma with right   slightly less than left. Alternatively, the appearance may be related to   right-sided striated nephrogram. There is no renal abscess. There is mild   fullness of the right lower intrarenal collecting system, mild to   moderate dilatation of the right renal pelvis and moderate right   hydroureter. There is right-sided urothelial enhancement and asymmetric   right greater than left perinephricfat stranding. Findings are highly   concerning for ascending infection/ureteritis/nephritis. Correlate with   urinalysis.    Left parapelvic renal cysts and mild dilatation of the left renal pelvis.    Prostatomegaly. Urinary bladder base is indented by nodular enlarged   prostate gland. Urinary bladder wall is normal.    No bowel obstruction or inflammation.    < end of copied text >

## 2022-04-01 NOTE — PROGRESS NOTE ADULT - ASSESSMENT
61yo Male admitted for fevers.        Plan: consider repeat CT scan abdomen/pelvis with IV contrast once DENNY resolved. Creatinine 1.53 today.      SIRS criteria on arrival (WBC, tachycardia)    obtain HIV test (patient gave verbal consent)    Agree to start empirically Zosyn, UA appears normal but CT suggests pylo with fever. Follow blood and urine cultures.      CXR is clear, creatinine 1.53, add /h, follow in AM.  Flu panel and COVID all negative.     Continue Flomax and Proscar for BPH.  Check PVR tomorrow at bedside.      61yo Male admitted for fevers.        Plan: Likely pylonephritis. Sepsis resolved, IVF stopped.       SIRS criteria on arrival (WBC, tachycardia), now resolved.     obtain HIV test (patient gave verbal consent)    Agree to start empirically Zosyn, UA appears normal but CT suggests pylo with fever. Follow blood and urine cultures.      CXR is clear, creatinine 1.53, add /h, follow in AM.  Flu panel and COVID all negative.     Continue Flomax and Proscar for BPH.  Check PVR tomorrow at bedside.     Urine culture shows enterococcus.  Appreciate ID consult.

## 2022-04-01 NOTE — PROGRESS NOTE ADULT - ASSESSMENT
62y male with PMH significant for HLD, UC on balsalazide & BPH    He was hospitalized b/w 2/21 - 2/23/22 with sepsis from bilateral pyelonephritis in the setting of an enlarged prostate. Treated with 3 days of empiric IV Ceftriaxone & he was sent home on empiric Cefuroxime with negative cx. He recovered fully. He also has OPD cystoscopy a few weeks ago, placed on prostate medication.  As of 3/27/22, he developed fevers, vomited, lost his appetite, developed some diarrhea.   On 3/29/22 found febrile to 102.7F at PCP office & had reported  symptoms   Here febrile, tachycardic, WBC 16.92 with 86% polys and in DENNY  Has had multiple small loose BMs since presented & vomited x 1   CT repeated without hydronephrosis, slightly decreased right hydroureter but still with findings of b/l pyelonephritis     He can have DENNY, leukocytosis & fevers from C diff diarrhea, or alternatively, he has prostatitis serving as a nidus for recurrent/ or partially treated pyelonephritis & diarrhea is from inflamed bowels in the setting of acute infection.  Urine cx can be negative in case of prostatitis.  He is now known to have enterococcus in his urine. While he does not have cystitis symptoms or flank pain, this may point towards a  focus to infection.  He is feeling a little better today. ? Early response to treatment.  C Diff toxin is negative and diarrhea is improving.  His fevers are improved, he is clearly responding to antibiotic therapy  He would seem to have recurrent  sepsis  Recommend:  Renal / bladder / prostate sonogram  pending  Continue Zosyn . monitor for response, we will  narrow to ampicillin if enterococcus is sole isolate and is sensitive as expected.  Hydration & monitor creatinine , it has normalized  Follow fevers, cultures & BM frequency  Favor Urology input, not clear why he is having recurrent infections, even though his course is a little atypical I still suspect a  focus.  Urology note appreciated, they have declined consult for now.  I think that his large prostate has contributed to infection, his mild rt sided dilated collecting system has improved.  Will likely complete a total of 3 weeks directed against enterococcus, anticipate oral conversion in next 1-2 days.  He will need OPD f/u by urology, ID, and IM as he has had recurrent infections.

## 2022-04-01 NOTE — CHART NOTE - NSCHARTNOTEFT_GEN_A_CORE
Patient currently remains on Zosyn for UTI with improved fever curve and Leukocytosis. PVR performed as per PMD request. Patient voided 150 cc of urine; PVR Bladder scan performed Patient with 259 cc of urine noted on bladder scan. Will continue Flomax and Proscar and Initiate Bladder scans q 8hrs with Straight cath PRN > 400 cc of retained urine. Patient currently remains on Zosyn for UTI with improved fever curve and Leukocytosis. PVR performed as per PMD request. Patient voided 150 cc of urine; PVR Bladder scan performed Patient with 259 cc of urine noted on bladder scan. Will continue Flomax and Proscar and Initiate Bladder scans q 8hrs with Straight cath PRN > 400 cc of retained urine. Case d/w  if patient requires to be straight cath'd more then 2 x will place Clayton catheter.

## 2022-04-01 NOTE — PROGRESS NOTE ADULT - SUBJECTIVE AND OBJECTIVE BOX
INTERVAL HPI/OVERNIGHT EVENTS:  Pt seen and examined at bedside.     Allergies/Intolerance: No Known Allergies      MEDICATIONS  (STANDING):  ascorbic acid 500 milliGRAM(s) Oral daily  atorvastatin 40 milliGRAM(s) Oral at bedtime  balsalazide 2250 milliGRAM(s) Oral three times a day  finasteride 5 milliGRAM(s) Oral daily  heparin   Injectable 5000 Unit(s) SubCutaneous every 8 hours  influenza   Vaccine 0.5 milliLiter(s) IntraMuscular once  lactated ringers. 1000 milliLiter(s) (100 mL/Hr) IV Continuous <Continuous>  piperacillin/tazobactam IVPB.. 3.375 Gram(s) IV Intermittent every 8 hours  tamsulosin 0.4 milliGRAM(s) Oral at bedtime    MEDICATIONS  (PRN):  acetaminophen     Tablet .. 650 milliGRAM(s) Oral every 6 hours PRN Temp greater or equal to 38C (100.4F), Mild Pain (1 - 3)        ROS: all systems reviewed and wnl      PHYSICAL EXAMINATION:  Vital Signs Last 24 Hrs  T(C): 37.1 (01 Apr 2022 04:50), Max: 37.3 (31 Mar 2022 12:05)  T(F): 98.7 (01 Apr 2022 04:50), Max: 99.2 (31 Mar 2022 21:33)  HR: 72 (01 Apr 2022 04:50) (72 - 78)  BP: 101/62 (01 Apr 2022 04:50) (101/62 - 116/69)  BP(mean): --  RR: 18 (01 Apr 2022 04:50) (18 - 19)  SpO2: 100% (01 Apr 2022 04:50) (97% - 100%)  CAPILLARY BLOOD GLUCOSE          03-31 @ 07:01  -  04-01 @ 07:00  --------------------------------------------------------  IN: 1300 mL / OUT: 0 mL / NET: 1300 mL        GENERAL: stable, in bed, no fevers or flank pain.   NECK: supple, No JVD  CHEST/LUNG: clear to auscultation bilaterally; no rales, rhonchi, or wheezing b/l  HEART: normal S1, S2  ABDOMEN: BS+, soft, ND, NT   EXTREMITIES:  pulses palpable; no clubbing, cyanosis, or edema b/l LEs  SKIN: no rashes or lesions      LABS:                        10.4   9.72  )-----------( 142      ( 01 Apr 2022 06:46 )             30.5     04-01    133<L>  |  103  |  16  ----------------------------<  106<H>  3.2<L>   |  20<L>  |  1.02    Ca    8.2<L>      01 Apr 2022 06:43  Phos  2.2     04-01  Mg     1.9     04-01    TPro  x   /  Alb  x   /  TBili  0.3  /  DBili  x   /  AST  x   /  ALT  x   /  AlkPhos  x   03-31    PT/INR - ( 31 Mar 2022 06:50 )   PT: 14.7 sec;   INR: 1.28 ratio

## 2022-04-02 LAB
ANA TITR SER: NEGATIVE — SIGNIFICANT CHANGE UP
ANION GAP SERPL CALC-SCNC: 10 MMOL/L — SIGNIFICANT CHANGE UP (ref 5–17)
BUN SERPL-MCNC: 15 MG/DL — SIGNIFICANT CHANGE UP (ref 7–23)
CALCIUM SERPL-MCNC: 8.6 MG/DL — SIGNIFICANT CHANGE UP (ref 8.4–10.5)
CHLORIDE SERPL-SCNC: 104 MMOL/L — SIGNIFICANT CHANGE UP (ref 96–108)
CO2 SERPL-SCNC: 23 MMOL/L — SIGNIFICANT CHANGE UP (ref 22–31)
CREAT SERPL-MCNC: 0.89 MG/DL — SIGNIFICANT CHANGE UP (ref 0.5–1.3)
EGFR: 98 ML/MIN/1.73M2 — SIGNIFICANT CHANGE UP
GLUCOSE SERPL-MCNC: 119 MG/DL — HIGH (ref 70–99)
HCT VFR BLD CALC: 32.1 % — LOW (ref 39–50)
HGB BLD-MCNC: 10.8 G/DL — LOW (ref 13–17)
MAGNESIUM SERPL-MCNC: 2 MG/DL — SIGNIFICANT CHANGE UP (ref 1.6–2.6)
MCHC RBC-ENTMCNC: 30.2 PG — SIGNIFICANT CHANGE UP (ref 27–34)
MCHC RBC-ENTMCNC: 33.6 GM/DL — SIGNIFICANT CHANGE UP (ref 32–36)
MCV RBC AUTO: 89.7 FL — SIGNIFICANT CHANGE UP (ref 80–100)
NRBC # BLD: 0 /100 WBCS — SIGNIFICANT CHANGE UP (ref 0–0)
PHOSPHATE SERPL-MCNC: 2.6 MG/DL — SIGNIFICANT CHANGE UP (ref 2.5–4.5)
PLATELET # BLD AUTO: 161 K/UL — SIGNIFICANT CHANGE UP (ref 150–400)
POTASSIUM SERPL-MCNC: 3.4 MMOL/L — LOW (ref 3.5–5.3)
POTASSIUM SERPL-SCNC: 3.4 MMOL/L — LOW (ref 3.5–5.3)
RBC # BLD: 3.58 M/UL — LOW (ref 4.2–5.8)
RBC # FLD: 14.9 % — HIGH (ref 10.3–14.5)
SODIUM SERPL-SCNC: 137 MMOL/L — SIGNIFICANT CHANGE UP (ref 135–145)
WBC # BLD: 7.76 K/UL — SIGNIFICANT CHANGE UP (ref 3.8–10.5)
WBC # FLD AUTO: 7.76 K/UL — SIGNIFICANT CHANGE UP (ref 3.8–10.5)

## 2022-04-02 RX ORDER — AMPICILLIN TRIHYDRATE 250 MG
2 CAPSULE ORAL EVERY 6 HOURS
Refills: 0 | Status: DISCONTINUED | OUTPATIENT
Start: 2022-04-02 | End: 2022-04-03

## 2022-04-02 RX ORDER — SODIUM CHLORIDE 9 MG/ML
1000 INJECTION, SOLUTION INTRAVENOUS
Refills: 0 | Status: DISCONTINUED | OUTPATIENT
Start: 2022-04-02 | End: 2022-04-03

## 2022-04-02 RX ADMIN — HEPARIN SODIUM 5000 UNIT(S): 5000 INJECTION INTRAVENOUS; SUBCUTANEOUS at 21:16

## 2022-04-02 RX ADMIN — FINASTERIDE 5 MILLIGRAM(S): 5 TABLET, FILM COATED ORAL at 13:34

## 2022-04-02 RX ADMIN — Medication 1 PACKET(S): at 05:10

## 2022-04-02 RX ADMIN — HEPARIN SODIUM 5000 UNIT(S): 5000 INJECTION INTRAVENOUS; SUBCUTANEOUS at 05:10

## 2022-04-02 RX ADMIN — Medication 200 GRAM(S): at 17:15

## 2022-04-02 RX ADMIN — BALSALAZIDE DISODIUM 2250 MILLIGRAM(S): 750 CAPSULE ORAL at 21:15

## 2022-04-02 RX ADMIN — Medication 200 GRAM(S): at 23:18

## 2022-04-02 RX ADMIN — BALSALAZIDE DISODIUM 2250 MILLIGRAM(S): 750 CAPSULE ORAL at 05:10

## 2022-04-02 RX ADMIN — SODIUM CHLORIDE 75 MILLILITER(S): 9 INJECTION, SOLUTION INTRAVENOUS at 13:50

## 2022-04-02 RX ADMIN — PIPERACILLIN AND TAZOBACTAM 25 GRAM(S): 4; .5 INJECTION, POWDER, LYOPHILIZED, FOR SOLUTION INTRAVENOUS at 04:59

## 2022-04-02 RX ADMIN — Medication 200 GRAM(S): at 13:33

## 2022-04-02 RX ADMIN — Medication 500 MILLIGRAM(S): at 13:34

## 2022-04-02 RX ADMIN — HEPARIN SODIUM 5000 UNIT(S): 5000 INJECTION INTRAVENOUS; SUBCUTANEOUS at 13:35

## 2022-04-02 RX ADMIN — TAMSULOSIN HYDROCHLORIDE 0.4 MILLIGRAM(S): 0.4 CAPSULE ORAL at 21:16

## 2022-04-02 RX ADMIN — BALSALAZIDE DISODIUM 2250 MILLIGRAM(S): 750 CAPSULE ORAL at 13:34

## 2022-04-02 RX ADMIN — ATORVASTATIN CALCIUM 40 MILLIGRAM(S): 80 TABLET, FILM COATED ORAL at 21:17

## 2022-04-02 NOTE — PROGRESS NOTE ADULT - ASSESSMENT
61yo Male admitted for fevers.        Plan: Likely pylonephritis. Sepsis resolved, IVF stopped.       SIRS criteria on arrival (WBC, tachycardia), now resolved.     Started empirically Zosyn, UA appears normal but CT suggests pylo with fever. Urine cultures confirm enterococcus.       CXR is clear, creatinine 1.53, add /h, follow in AM.  Flu panel and COVID all negative.     Continue Flomax and Proscar for BPH.  PVR yesterday was 250 ml. No need for foster now.      Urine culture shows enterococcus.  Appreciate ID consult.

## 2022-04-02 NOTE — PROGRESS NOTE ADULT - SUBJECTIVE AND OBJECTIVE BOX
INTERVAL HPI/OVERNIGHT EVENTS:  Pt seen and examined at bedside.     Allergies/Intolerance: No Known Allergies      MEDICATIONS  (STANDING):  ampicillin  IVPB 2 Gram(s) IV Intermittent every 6 hours  ascorbic acid 500 milliGRAM(s) Oral daily  atorvastatin 40 milliGRAM(s) Oral at bedtime  balsalazide 2250 milliGRAM(s) Oral three times a day  finasteride 5 milliGRAM(s) Oral daily  heparin   Injectable 5000 Unit(s) SubCutaneous every 8 hours  influenza   Vaccine 0.5 milliLiter(s) IntraMuscular once  tamsulosin 0.4 milliGRAM(s) Oral at bedtime    MEDICATIONS  (PRN):  acetaminophen     Tablet .. 650 milliGRAM(s) Oral every 6 hours PRN Temp greater or equal to 38C (100.4F), Mild Pain (1 - 3)        ROS: all systems reviewed and wnl      PHYSICAL EXAMINATION:  Vital Signs Last 24 Hrs  T(C): 37.2 (02 Apr 2022 03:58), Max: 37.5 (01 Apr 2022 20:37)  T(F): 98.9 (02 Apr 2022 03:58), Max: 99.5 (01 Apr 2022 20:37)  HR: 71 (02 Apr 2022 03:58) (71 - 75)  BP: 108/64 (02 Apr 2022 03:58) (102/65 - 108/64)  BP(mean): --  RR: 18 (02 Apr 2022 03:58) (18 - 18)  SpO2: 97% (02 Apr 2022 03:58) (97% - 100%)  CAPILLARY BLOOD GLUCOSE          04-01 @ 07:01 - 04-02 @ 07:00  --------------------------------------------------------  IN: 200 mL / OUT: 1250 mL / NET: -1050 mL    04-02 @ 07:01  -  04-02 @ 11:35  --------------------------------------------------------  IN: 0 mL / OUT: 400 mL / NET: -400 mL        GENERAL: stable, no fevers, no hematuria, no flank pain.   NECK: supple, No JVD  CHEST/LUNG: clear to auscultation bilaterally; no rales, rhonchi, or wheezing b/l  HEART: normal S1, S2  ABDOMEN: BS+, soft, ND, NT   EXTREMITIES:  pulses palpable; no clubbing, cyanosis, or edema b/l LEs  SKIN: no rashes or lesions      LABS:                        10.8   7.76  )-----------( 161      ( 02 Apr 2022 06:41 )             32.1     04-02    137  |  104  |  15  ----------------------------<  119<H>  3.4<L>   |  23  |  0.89    Ca    8.6      02 Apr 2022 06:41  Phos  2.6     04-02  Mg     2.0     04-02

## 2022-04-02 NOTE — PROGRESS NOTE ADULT - ASSESSMENT
62y male with PMH significant for HLD, UC on balsalazide & BPH    He was hospitalized b/w 2/21 - 2/23/22 with sepsis from bilateral pyelonephritis in the setting of an enlarged prostate. Treated with 3 days of empiric IV Ceftriaxone & he was sent home on empiric Cefuroxime with negative cx. He recovered fully. He also has OPD cystoscopy a few weeks ago, placed on prostate medication.  As of 3/27/22, he developed fevers, vomited, lost his appetite, developed some diarrhea.   On 3/29/22 found febrile to 102.7F at PCP office & had reported  symptoms   Here febrile, tachycardic, WBC 16.92 with 86% polys and in DENNY  Has had multiple small loose BMs since presented & vomited x 1   CT repeated without hydronephrosis, slightly decreased right hydroureter but still with findings of b/l pyelonephritis     He can have DENNY, leukocytosis & fevers from C diff diarrhea, or alternatively, he has prostatitis serving as a nidus for recurrent/ or partially treated pyelonephritis & diarrhea is from inflamed bowels in the setting of acute infection.  Urine cx can be negative in case of prostatitis.  He is now known to have enterococcus in his urine. While he does not have cystitis symptoms or flank pain, this may point towards a  focus to infection.  He is feeling a little better today. ? Early response to treatment.  C Diff toxin is negative  His PVR's have been as high as 250  His urine isolate is ampicillin sensitive  Renal/bladder sonogram with a large prostate, no hydro  Recommend:  Will change to IV ampicillin to narrow coverage  If he remains stable will change to PO amoxicillin in AM to allow  completion of 2 weeks of antibiotics as OPD  He appears to have recurrent  infections, hopefully with proscar and finasteride use he can minimize the chances of a relapse

## 2022-04-02 NOTE — PROGRESS NOTE ADULT - SUBJECTIVE AND OBJECTIVE BOX
CC: f/u for recurrent urosepsis    Patient reports: he was monitored over night with PVR, some about 250 but he reports that his last PVR was low.    REVIEW OF SYSTEMS:  All other review of systems negative (Comprehensive ROS)    Antimicrobials Day #  :day 3  piperacillin/tazobactam IVPB.. 3.375 Gram(s) IV Intermittent every 8 hours    Other Medications Reviewed  MEDICATIONS  (STANDING):  ascorbic acid 500 milliGRAM(s) Oral daily  atorvastatin 40 milliGRAM(s) Oral at bedtime  balsalazide 2250 milliGRAM(s) Oral three times a day  finasteride 5 milliGRAM(s) Oral daily  heparin   Injectable 5000 Unit(s) SubCutaneous every 8 hours  influenza   Vaccine 0.5 milliLiter(s) IntraMuscular once  piperacillin/tazobactam IVPB.. 3.375 Gram(s) IV Intermittent every 8 hours  tamsulosin 0.4 milliGRAM(s) Oral at bedtime    T(F): 98.9 (04-02-22 @ 03:58), Max: 99.5 (04-01-22 @ 20:37)  HR: 71 (04-02-22 @ 03:58)  BP: 108/64 (04-02-22 @ 03:58)  RR: 18 (04-02-22 @ 03:58)  SpO2: 97% (04-02-22 @ 03:58)  Wt(kg): --    PHYSICAL EXAM:  General: alert, no acute distress  Eyes:  anicteric, no conjunctival injection, no discharge  Oropharynx: no lesions or injection 	  Neck: supple, without adenopathy  Lungs: clear to auscultation  Heart: regular rate and rhythm; no murmur, rubs or gallops  Abdomen: soft, nondistended, nontender, without mass or organomegaly  Skin: no lesions  Extremities: no clubbing, cyanosis, or edema  Neurologic: alert, oriented, moves all extremities    LAB RESULTS:                        10.8   7.76  )-----------( 161      ( 02 Apr 2022 06:41 )             32.1     04-02    137  |  104  |  15  ----------------------------<  119<H>  3.4<L>   |  23  |  0.89    Ca    8.6      02 Apr 2022 06:41  Phos  2.6     04-02  Mg     2.0     04-02          MICROBIOLOGY:  RECENT CULTURES:  03-30 @ 08:54 .Blood Blood, isolator tube     Testing in progress      03-29 @ 22:03 Clean Catch Clean Catch (Midstream) Enterococcus faecalis    >100,000 CFU/ml Enterococcus faecalis      03-29 @ 18:47 .Blood Blood-Peripheral     No growth to date.          RADIOLOGY REVIEWED:    < from: US Renal (04.01.22 @ 14:48) >  INTERPRETATION:  CLINICAL INFORMATION: Recurrent pyelonephritis.    COMPARISON: Correlation is made with noncontrast abdominal and pelvic CT   dated 3/30/2022.    TECHNIQUE: Portable Sonography of the kidneys and bladder. Transabdominal   evaluation of the prostate gland was also performed.    FINDINGS:  Right kidney: 13.1 cm. No hydronephrosis. Cortical scarring in the   interpolar region.    Left kidney: 12.9 cm. No hydronephrosis. Parapelvic cysts.    Urinary bladder: Within normal limits. Post void bladder volume was 210   cc.    Prostate gland: Enlarged prostate gland indentsthe posterior aspect of   the urinary bladder. Estimated prostate volume is 71.8 g.    IMPRESSION:  No hydronephrosis. Left parapelvic cysts.    Enlarged prostate gland indenting the posterior bladder wall, with   estimated prostate volume of 71.8 g.    < end of copied text >

## 2022-04-03 ENCOUNTER — TRANSCRIPTION ENCOUNTER (OUTPATIENT)
Age: 62
End: 2022-04-03

## 2022-04-03 VITALS
SYSTOLIC BLOOD PRESSURE: 113 MMHG | HEART RATE: 68 BPM | TEMPERATURE: 98 F | DIASTOLIC BLOOD PRESSURE: 75 MMHG | RESPIRATION RATE: 18 BRPM

## 2022-04-03 PROCEDURE — 36415 COLL VENOUS BLD VENIPUNCTURE: CPT

## 2022-04-03 PROCEDURE — 80053 COMPREHEN METABOLIC PANEL: CPT

## 2022-04-03 PROCEDURE — 85014 HEMATOCRIT: CPT

## 2022-04-03 PROCEDURE — 87103 BLOOD FUNGUS CULTURE: CPT

## 2022-04-03 PROCEDURE — 76857 US EXAM PELVIC LIMITED: CPT

## 2022-04-03 PROCEDURE — 82803 BLOOD GASES ANY COMBINATION: CPT

## 2022-04-03 PROCEDURE — 85652 RBC SED RATE AUTOMATED: CPT

## 2022-04-03 PROCEDURE — 86850 RBC ANTIBODY SCREEN: CPT

## 2022-04-03 PROCEDURE — 82435 ASSAY OF BLOOD CHLORIDE: CPT

## 2022-04-03 PROCEDURE — 82330 ASSAY OF CALCIUM: CPT

## 2022-04-03 PROCEDURE — 87637 SARSCOV2&INF A&B&RSV AMP PRB: CPT

## 2022-04-03 PROCEDURE — 85025 COMPLETE CBC W/AUTO DIFF WBC: CPT

## 2022-04-03 PROCEDURE — 87040 BLOOD CULTURE FOR BACTERIA: CPT

## 2022-04-03 PROCEDURE — 83690 ASSAY OF LIPASE: CPT

## 2022-04-03 PROCEDURE — 84300 ASSAY OF URINE SODIUM: CPT

## 2022-04-03 PROCEDURE — 87389 HIV-1 AG W/HIV-1&-2 AB AG IA: CPT

## 2022-04-03 PROCEDURE — 99285 EMERGENCY DEPT VISIT HI MDM: CPT | Mod: 25

## 2022-04-03 PROCEDURE — 84132 ASSAY OF SERUM POTASSIUM: CPT

## 2022-04-03 PROCEDURE — 76775 US EXAM ABDO BACK WALL LIM: CPT

## 2022-04-03 PROCEDURE — 82570 ASSAY OF URINE CREATININE: CPT

## 2022-04-03 PROCEDURE — 87086 URINE CULTURE/COLONY COUNT: CPT

## 2022-04-03 PROCEDURE — 80048 BASIC METABOLIC PNL TOTAL CA: CPT

## 2022-04-03 PROCEDURE — 87449 NOS EACH ORGANISM AG IA: CPT

## 2022-04-03 PROCEDURE — 84100 ASSAY OF PHOSPHORUS: CPT

## 2022-04-03 PROCEDURE — 82947 ASSAY GLUCOSE BLOOD QUANT: CPT

## 2022-04-03 PROCEDURE — 85018 HEMOGLOBIN: CPT

## 2022-04-03 PROCEDURE — 81001 URINALYSIS AUTO W/SCOPE: CPT

## 2022-04-03 PROCEDURE — 86901 BLOOD TYPING SEROLOGIC RH(D): CPT

## 2022-04-03 PROCEDURE — 86140 C-REACTIVE PROTEIN: CPT

## 2022-04-03 PROCEDURE — 74176 CT ABD & PELVIS W/O CONTRAST: CPT

## 2022-04-03 PROCEDURE — 87186 SC STD MICRODIL/AGAR DIL: CPT

## 2022-04-03 PROCEDURE — 86038 ANTINUCLEAR ANTIBODIES: CPT

## 2022-04-03 PROCEDURE — 86900 BLOOD TYPING SEROLOGIC ABO: CPT

## 2022-04-03 PROCEDURE — 83735 ASSAY OF MAGNESIUM: CPT

## 2022-04-03 PROCEDURE — 85027 COMPLETE CBC AUTOMATED: CPT

## 2022-04-03 PROCEDURE — 84295 ASSAY OF SERUM SODIUM: CPT

## 2022-04-03 PROCEDURE — 71045 X-RAY EXAM CHEST 1 VIEW: CPT

## 2022-04-03 PROCEDURE — 93005 ELECTROCARDIOGRAM TRACING: CPT

## 2022-04-03 PROCEDURE — 83605 ASSAY OF LACTIC ACID: CPT

## 2022-04-03 PROCEDURE — 87324 CLOSTRIDIUM AG IA: CPT

## 2022-04-03 RX ORDER — FINASTERIDE 5 MG/1
5 TABLET, FILM COATED ORAL DAILY
Refills: 0 | Status: DISCONTINUED | OUTPATIENT
Start: 2022-04-03 | End: 2022-04-03

## 2022-04-03 RX ORDER — AMOXICILLIN 250 MG/5ML
500 SUSPENSION, RECONSTITUTED, ORAL (ML) ORAL THREE TIMES A DAY
Refills: 0 | Status: DISCONTINUED | OUTPATIENT
Start: 2022-04-03 | End: 2022-04-03

## 2022-04-03 RX ORDER — TAMSULOSIN HYDROCHLORIDE 0.4 MG/1
2 CAPSULE ORAL
Qty: 0 | Refills: 0 | DISCHARGE
Start: 2022-04-03

## 2022-04-03 RX ORDER — TAMSULOSIN HYDROCHLORIDE 0.4 MG/1
0.8 CAPSULE ORAL AT BEDTIME
Refills: 0 | Status: DISCONTINUED | OUTPATIENT
Start: 2022-04-03 | End: 2022-04-03

## 2022-04-03 RX ORDER — AMOXICILLIN 250 MG/5ML
1 SUSPENSION, RECONSTITUTED, ORAL (ML) ORAL
Qty: 30 | Refills: 0
Start: 2022-04-03 | End: 2022-04-12

## 2022-04-03 RX ADMIN — Medication 200 GRAM(S): at 05:50

## 2022-04-03 RX ADMIN — BALSALAZIDE DISODIUM 2250 MILLIGRAM(S): 750 CAPSULE ORAL at 05:51

## 2022-04-03 RX ADMIN — Medication 500 MILLIGRAM(S): at 12:03

## 2022-04-03 RX ADMIN — FINASTERIDE 5 MILLIGRAM(S): 5 TABLET, FILM COATED ORAL at 12:04

## 2022-04-03 RX ADMIN — HEPARIN SODIUM 5000 UNIT(S): 5000 INJECTION INTRAVENOUS; SUBCUTANEOUS at 05:50

## 2022-04-03 RX ADMIN — SODIUM CHLORIDE 75 MILLILITER(S): 9 INJECTION, SOLUTION INTRAVENOUS at 05:50

## 2022-04-03 NOTE — PROGRESS NOTE ADULT - PROVIDER SPECIALTY LIST ADULT
Hospitalist
Infectious Disease
Internal Medicine

## 2022-04-03 NOTE — PROGRESS NOTE ADULT - ASSESSMENT
63yo Male admitted for fevers.        Plan: Likely pylonephritis. Sepsis resolved, IVF stopped.   SIRS criteria on arrival (WBC, tachycardia), now resolved.     Started empirically Zosyn, UA appears normal but CT suggests pylo with fever. Urine cultures confirm enterococcus.       CXR is clear, creatinine 1.53, stop IVF. Flu panel and COVID all negative.     Continue Flomax and Proscar for BPH.  PVR yesterday was 250 ml. No need for foster now.  Later PVR was better.     Urine culture shows enterococcus.  Appreciate ID consult.  Needs Urology follow up after discharge regarding BPH.

## 2022-04-03 NOTE — PROGRESS NOTE ADULT - SUBJECTIVE AND OBJECTIVE BOX
CC: f/u for enterococcal  infection    Patient reports no complaints, he is voiding well,     REVIEW OF SYSTEMS:  All other review of systems negative (Comprehensive ROS)    Antimicrobials Day #  :day 4  ampicillin  IVPB 2 Gram(s) IV Intermittent every 6 hours    Other Medications Reviewed  MEDICATIONS  (STANDING):  ampicillin  IVPB 2 Gram(s) IV Intermittent every 6 hours  ascorbic acid 500 milliGRAM(s) Oral daily  atorvastatin 40 milliGRAM(s) Oral at bedtime  balsalazide 2250 milliGRAM(s) Oral three times a day  finasteride 5 milliGRAM(s) Oral daily  heparin   Injectable 5000 Unit(s) SubCutaneous every 8 hours  influenza   Vaccine 0.5 milliLiter(s) IntraMuscular once  lactated ringers. 1000 milliLiter(s) (75 mL/Hr) IV Continuous <Continuous>  tamsulosin 0.4 milliGRAM(s) Oral at bedtime    T(F): 98.3 (04-03-22 @ 03:39), Max: 98.3 (04-03-22 @ 03:39)  HR: 60 (04-03-22 @ 03:39)  BP: 114/76 (04-03-22 @ 03:39)  RR: 18 (04-03-22 @ 03:39)  SpO2: 100% (04-03-22 @ 03:39)  Wt(kg): --    PHYSICAL EXAM:  General: alert, no acute distress  Eyes:  anicteric, no conjunctival injection, no discharge  Oropharynx: no lesions or injection 	  Neck: supple, without adenopathy  Lungs: clear to auscultation  Heart: regular rate and rhythm; no murmur, rubs or gallops  Abdomen: soft, nondistended, nontender, without mass or organomegaly  Skin: no lesions  Extremities: no clubbing, cyanosis, or edema  Neurologic: alert, oriented, moves all extremities    LAB RESULTS:                        10.8   7.76  )-----------( 161      ( 02 Apr 2022 06:41 )             32.1     04-02    137  |  104  |  15  ----------------------------<  119<H>  3.4<L>   |  23  |  0.89    Ca    8.6      02 Apr 2022 06:41  Phos  2.6     04-02  Mg     2.0     04-02          MICROBIOLOGY:  RECENT CULTURES:  03-30 @ 08:54 .Blood Blood, isolator tube     Testing in progress      03-29 @ 22:03 Clean Catch Clean Catch (Midstream) Enterococcus faecalis    >100,000 CFU/ml Enterococcus faecalis      03-29 @ 18:47 .Blood Blood-Peripheral     No growth to date.          RADIOLOGY REVIEWED:  < from: US Renal (04.01.22 @ 14:48) >  IMPRESSION:  No hydronephrosis. Left parapelvic cysts.    Enlarged prostate gland indenting the posterior bladder wall, with   estimated prostate volume of 71.8 g.    < end of copied text >

## 2022-04-03 NOTE — DISCHARGE NOTE PROVIDER - CARE PROVIDER_API CALL
Aubrey Werner)  Urology  [address] [place] [address]  Phone: [phone]  Fax: [phone]  Follow Up Time:     Reggie Mandujano)  Infectious Disease; Internal Medicine  [address] [facility] [address]  [place] [address]  Phone: [phone]  Fax: [phone]  Follow Up Time:

## 2022-04-03 NOTE — DISCHARGE NOTE PROVIDER - NSDCCPCAREPLAN_GEN_ALL_CORE_FT
PRINCIPAL DISCHARGE DIAGNOSIS  Diagnosis: Enterococcus UTI  Assessment and Plan of Treatment: Follow-up with Dr. Mandujano from infectious disease in 2 weeks or if fevers re-occur      SECONDARY DISCHARGE DIAGNOSES  Diagnosis: Recurrent pyelonephritis  Assessment and Plan of Treatment: Continue Amoxicillin 500mg 3 times/day for a total of 10 days  Follow up with Dr. Abdullahi from urology in 1 week    Diagnosis: BPH without urinary obstruction  Assessment and Plan of Treatment: continue Flomax 0.8mg at bedtime  continue Proscar 5mg every day

## 2022-04-03 NOTE — PROGRESS NOTE ADULT - SUBJECTIVE AND OBJECTIVE BOX
INTERVAL HPI/OVERNIGHT EVENTS:  Pt seen and examined at bedside.     Allergies/Intolerance: No Known Allergies      MEDICATIONS  (STANDING):  amoxicillin 500 milliGRAM(s) Oral three times a day  ascorbic acid 500 milliGRAM(s) Oral daily  atorvastatin 40 milliGRAM(s) Oral at bedtime  balsalazide 2250 milliGRAM(s) Oral three times a day  finasteride 5 milliGRAM(s) Oral daily  heparin   Injectable 5000 Unit(s) SubCutaneous every 8 hours  influenza   Vaccine 0.5 milliLiter(s) IntraMuscular once  lactated ringers. 1000 milliLiter(s) (75 mL/Hr) IV Continuous <Continuous>  tamsulosin 0.4 milliGRAM(s) Oral at bedtime    MEDICATIONS  (PRN):  acetaminophen     Tablet .. 650 milliGRAM(s) Oral every 6 hours PRN Temp greater or equal to 38C (100.4F), Mild Pain (1 - 3)        ROS: all systems reviewed and wnl      PHYSICAL EXAMINATION:  Vital Signs Last 24 Hrs  T(C): 36.8 (03 Apr 2022 03:39), Max: 36.8 (03 Apr 2022 03:39)  T(F): 98.3 (03 Apr 2022 03:39), Max: 98.3 (03 Apr 2022 03:39)  HR: 60 (03 Apr 2022 03:39) (60 - 85)  BP: 114/76 (03 Apr 2022 03:39) (114/76 - 132/47)  BP(mean): --  RR: 18 (03 Apr 2022 03:39) (18 - 18)  SpO2: 100% (03 Apr 2022 03:39) (99% - 100%)  CAPILLARY BLOOD GLUCOSE          04-02 @ 07:01  -  04-03 @ 07:00  --------------------------------------------------------  IN: 1600 mL / OUT: 1900 mL / NET: -300 mL        GENERAL: in bed, afebrile, no hematuria   NECK: supple, No JVD  CHEST/LUNG: clear to auscultation bilaterally; no rales, rhonchi, or wheezing b/l  HEART: normal S1, S2  ABDOMEN: BS+, soft, ND, NT   EXTREMITIES:  pulses palpable; no clubbing, cyanosis, or edema b/l LEs  SKIN: no rashes or lesions      LABS:                        10.8   7.76  )-----------( 161      ( 02 Apr 2022 06:41 )             32.1     04-02    137  |  104  |  15  ----------------------------<  119<H>  3.4<L>   |  23  |  0.89    Ca    8.6      02 Apr 2022 06:41  Phos  2.6     04-02  Mg     2.0     04-02

## 2022-04-03 NOTE — PROGRESS NOTE ADULT - ASSESSMENT
62y male with PMH significant for HLD, UC on balsalazide & BPH    He was hospitalized b/w 2/21 - 2/23/22 with sepsis from bilateral pyelonephritis in the setting of an enlarged prostate. Treated with 3 days of empiric IV Ceftriaxone & he was sent home on empiric Cefuroxime with negative cx. He recovered fully. He also has OPD cystoscopy a few weeks ago, placed on prostate medication.  As of 3/27/22, he developed fevers, vomited, lost his appetite, developed some diarrhea.   On 3/29/22 found febrile to 102.7F at PCP office & had reported  symptoms   Here febrile, tachycardic, WBC 16.92 with 86% polys and in DENNY  Has had multiple small loose BMs since presented & vomited x 1   CT repeated without hydronephrosis, slightly decreased right hydroureter but still with findings of b/l pyelonephritis     He can have DENNY, leukocytosis & fevers from C diff diarrhea, or alternatively, he has prostatitis serving as a nidus for recurrent/ or partially treated pyelonephritis & diarrhea is from inflamed bowels in the setting of acute infection.  Urine cx can be negative in case of prostatitis.  He is now known to have enterococcus in his urine. While he does not have cystitis symptoms or flank pain, this may point towards a  focus to infection.  He has clinically responded to antibiotics  C Diff toxin is negative  His PVR's have been as high as 250, now 160, he is on meds for prostate  His urine isolate is ampicillin sensitive  Renal/bladder sonogram with a large prostate, no hydro  He has tolerated IV ampicillin  UC appears stable  Recommend:  1.Will change to po amoxacillin 500 TID to complete 10 mores days of antibiotics  2.OPD  f/u  3.I have offered ID f/u as well  4.Discharge planning per primary service

## 2022-04-03 NOTE — DISCHARGE NOTE PROVIDER - NSDCMRMEDTOKEN_GEN_ALL_CORE_FT
Colazal 750 mg oral capsule: 3 cap(s) orally 3 times a day  Crestor 10 mg oral tablet: 1 tab(s) orally once a day (at bedtime)  finasteride 5 mg oral tablet: 1 tab(s) orally once a day  Multiple Vitamins oral tablet: 1 tab(s) orally once a day  tamsulosin 0.4 mg oral capsule: 1 cap(s) orally once a day (at bedtime)  Vitamin C 100 mg oral tablet: 1 tab(s) orally once a day   amoxicillin 500 mg oral capsule: 1 cap(s) orally 3 times a day  Colazal 750 mg oral capsule: 3 cap(s) orally 3 times a day  Crestor 10 mg oral tablet: 1 tab(s) orally once a day (at bedtime)  finasteride 5 mg oral tablet: 1 tab(s) orally once a day  Multiple Vitamins oral tablet: 1 tab(s) orally once a day  tamsulosin 0.4 mg oral capsule: 2 cap(s) orally once a day (at bedtime)  Vitamin C 100 mg oral tablet: 1 tab(s) orally once a day

## 2022-04-03 NOTE — DISCHARGE NOTE NURSING/CASE MANAGEMENT/SOCIAL WORK - PATIENT PORTAL LINK FT
You can access the FollowMyHealth Patient Portal offered by [facility] by registering at the following website: [email or web address]. By joining [employer]’s FollowMyHealth portal, you will also be able to view your health information using other applications (apps) compatible with our system.

## 2022-04-03 NOTE — DISCHARGE NOTE PROVIDER - HOSPITAL COURSE
63yo M w/ PMHx of ulcerative colitis and HLD presents with fever, patient was recently admitted 2/2022 to [facility] for similar complaints, was treated for UTI due to imaging findings concerning for acute infection although urine culture was unremarkable, since then the patient reports have scope performed with urology as outpatient, was told he needed a procedure performed on his prostate but was unable to clarify which, he completed course of cefuroxime on prior discharge, he was started on tamsulosin which he has been taking, he presents today for fever, chills and body aches which began 3 days ago, he took his temperature at home which was as high as 102.7, he tried taking tylenol which provided moderate relief, it was associated with nausea/vomiting, poor po intake and diarrhea, there were no aggravating or alleviating factors to his symptoms. In the ed, he was hemodynamically stable, mildly febrile, saturating well on room air, labs were significant for elevated Cr, patient was given Tylenol and 1L NS and admitted to general medicine.  Hospital course complicated by fevers and E. Faecalis pyelonephritis. 63yo M w/ PMHx of ulcerative colitis and HLD presents with fever, patient was recently admitted 2/2022 to [facility] for similar complaints, was treated for UTI due to imaging findings concerning for acute infection although urine culture was unremarkable, since then the patient reports have scope performed with urology as outpatient, was told he needed a procedure performed on his prostate but was unable to clarify which, he completed course of cefuroxime on prior discharge, he was started on tamsulosin which he has been taking, he presents today for fever, chills and body aches which began 3 days ago, he took his temperature at home which was as high as 102.7, he tried taking tylenol which provided moderate relief, it was associated with nausea/vomiting, poor po intake and diarrhea, there were no aggravating or alleviating factors to his symptoms. In the ed, he was hemodynamically stable, mildly febrile, saturating well on room air, labs were significant for elevated Cr, patient was given Tylenol and 1L NS and admitted to general medicine.  Hospital course complicated by fevers and E. Faecalis pyelonephritis.     Note: Sepsis present on arrival from pylonephritis. 63yo male PMH of ulcerative colitis and HLD presents with fever. Patient was recently admitted 2/2022 to [facility] for similar complaints, was treated for UTI due to imaging findings concerning for acute infection although urine culture was unremarkable. Since then the patient reports have scope performed with urology as outpatient, was told he needed a procedure performed on his prostate but was unable to clarify which. He was started on tamsulosin last admission which he has been taking.     Today he presents today for fever, chills and body aches which began 3 days ago, he took his temperature at home which was as high as 102.7, he tried taking tylenol which provided moderate relief. In the ER he was hemodynamically stable, mildly febrile, saturating well on room air, labs were significant for elevated creatinine. Had DENNY from sepsis on arrival, all resolved by discharge. Was given IV NS and admitted to general medicine.  Hospital course complicated by fevers and E. Faecalis pyelonephritis. Responded well to Zosyn, then Ampicillin. Discharged home after fevers resolved. Will see Urology in next week for possible TURP.   Renal sonogram, no hydro, prostate enlarged 70 grams on sonogram. See attached med list.      Note: Sepsis present on arrival from pylonephritis. 63yo male PMH of ulcerative colitis and HLD presents with fever. Patient was recently admitted 2/2022 to [facility] for similar complaints, was treated for UTI due to imaging findings concerning for acute infection although urine culture was unremarkable. Since then the patient reports have scope performed with urology as outpatient, was told he needed a procedure performed on his prostate but was unable to clarify which. He was started on tamsulosin and Proscar last admission which he has been taking.     Today he presents today for fever, chills and body aches which began 3 days ago, he took his temperature at home which was as high as 102.7, he tried taking tylenol which provided moderate relief. In the ER he was hemodynamically stable, mildly febrile, saturating well on room air, labs were significant for elevated creatinine. Had DENNY from sepsis on arrival, all resolved by discharge. Was given IV NS and admitted to general medicine.  Hospital course complicated by fevers and E. Faecalis pyelonephritis. Responded well to Zosyn, then Ampicillin.     Discharged home after fevers resolved. Will see Urology after discharge for possible TURP. Renal sonogram, no hydro, prostate enlarged 70 grams on sonogram. See attached med list.      Note: Sepsis present on arrival from pylonephritis.

## 2022-04-26 PROBLEM — Z00.00 ENCOUNTER FOR PREVENTIVE HEALTH EXAMINATION: Status: ACTIVE | Noted: 2022-04-26

## 2022-04-27 LAB
CULTURE RESULTS: SIGNIFICANT CHANGE UP
SPECIMEN SOURCE: SIGNIFICANT CHANGE UP

## 2022-04-29 ENCOUNTER — APPOINTMENT (OUTPATIENT)
Dept: MRI IMAGING | Facility: IMAGING CENTER | Age: 62
End: 2022-04-29

## 2023-03-01 NOTE — ED PROVIDER NOTE - WR ORDER DATE AND TIME 1
[Maximal Pain Intensity: 0/10] : 0/10 [Least Pain Intensity: 0/10] : 0/10 [80: Normal activity with effort; some signs or symptoms of disease.] : 80: Normal activity with effort; some signs or symptoms of disease.  21-Feb-2022 15:14

## 2023-03-27 NOTE — PATIENT PROFILE ADULT - NSTRANSFERBELONGINGSDISPO_GEN_A_NUR
Congestive heart failure due to volume overload..  Heart failure is newly identified.  NYHA Class III.  Continue current treatment regimen.  Dietary sodium restriction.  Encouraged daily monitoring of the patient's weight.  Regular aerobic exercise.  Continue current medications.  keep appt with cardiology. Information shared in AVS.   Heart failure will be reassessed in 3 months.  
with patient

## 2023-08-03 NOTE — ED ADULT NURSE NOTE - DOES PATIENT HAVE ADVANCE DIRECTIVE
unknown Sotyktu Counseling:  I discussed the most common side effects of Sotyktu including: common cold, sore throat, sinus infections, cold sores, canker sores, folliculitis, and acne.? I also discussed more serious side effects of Sotyktu including but not limited to: serious allergic reactions; increased risk for infections such as TB; cancers such as lymphomas; rhabdomyolysis and elevated CPK; and elevated triglycerides and liver enzymes.?

## 2024-12-21 ENCOUNTER — NON-APPOINTMENT (OUTPATIENT)
Age: 64
End: 2024-12-21

## 2025-07-06 ENCOUNTER — NON-APPOINTMENT (OUTPATIENT)
Age: 65
End: 2025-07-06